# Patient Record
Sex: FEMALE | Race: WHITE | NOT HISPANIC OR LATINO | Employment: PART TIME | URBAN - METROPOLITAN AREA
[De-identification: names, ages, dates, MRNs, and addresses within clinical notes are randomized per-mention and may not be internally consistent; named-entity substitution may affect disease eponyms.]

---

## 2022-10-03 ENCOUNTER — OFFICE VISIT (OUTPATIENT)
Dept: FAMILY MEDICINE CLINIC | Facility: CLINIC | Age: 32
End: 2022-10-03

## 2022-10-03 VITALS
WEIGHT: 98.4 LBS | HEART RATE: 79 BPM | DIASTOLIC BLOOD PRESSURE: 84 MMHG | BODY MASS INDEX: 19.32 KG/M2 | TEMPERATURE: 98 F | OXYGEN SATURATION: 98 % | HEIGHT: 60 IN | SYSTOLIC BLOOD PRESSURE: 120 MMHG

## 2022-10-03 DIAGNOSIS — R79.89 ELEVATED TSH: ICD-10-CM

## 2022-10-03 DIAGNOSIS — N63.12 BREAST LUMP ON RIGHT SIDE AT 1 O'CLOCK POSITION: ICD-10-CM

## 2022-10-03 DIAGNOSIS — Z13.6 ENCOUNTER FOR LIPID SCREENING FOR CARDIOVASCULAR DISEASE: ICD-10-CM

## 2022-10-03 DIAGNOSIS — Z00.00 ENCOUNTER FOR ANNUAL PHYSICAL EXAM: Primary | ICD-10-CM

## 2022-10-03 DIAGNOSIS — Z11.59 NEED FOR HEPATITIS C SCREENING TEST: ICD-10-CM

## 2022-10-03 DIAGNOSIS — Z13.220 ENCOUNTER FOR LIPID SCREENING FOR CARDIOVASCULAR DISEASE: ICD-10-CM

## 2022-10-03 PROCEDURE — 99385 PREV VISIT NEW AGE 18-39: CPT | Performed by: NURSE PRACTITIONER

## 2022-10-03 NOTE — PROGRESS NOTES
Subjective   Josue Castanon is a 32 y.o. female.     Chief Complaint   Patient presents with   • Breast Mass     Lump in right breast X 1 week needs referral and under armpit pain      • Annual Exam       History of Present Illness   New patient, here to re-establish care; previous PCP with [facility]; has not seen since COVID-19 pandemic started 3/2020; patient presents for CPE with fasting labs; pretty healthy diet, tries to avoid sugar in diet; regular exercise about twice per week, lifts weights and does squats, lunges, etc and walks about 2 miles daily; regular dental visits; last eye exam last week, wears contacts; no problems hearing; immunizations: declines flu or COVID-19 vaccine, will come back for Tdap; will be seeing Dr. Osuna for female care 12/2022; last PAP about 5 years ago at [facility]; mammo never performed; no family history of breast cancer; colonoscopy never performed; no family history of colon cancer; LMP 9/12/22; has regular menses.    Also, c/o lump in right breast and discomfort under right arm x1 week; lump seems a little smaller than when first noted; area is a little tender; no redness or bruising; no skin dimpling; no noted lump under right arm; will have aching sensation when sleeps with arm above head as has always done; no family history of breast cancer.      The following portions of the patient's history were reviewed and updated as appropriate: allergies, current medications, past family history, past medical history, past social history, past surgical history and problem list.    No current outpatient medications on file prior to visit.     No current facility-administered medications on file prior to visit.        Past Medical History:   Diagnosis Date   • Allergic rhinitis    • Eczema        Past Surgical History:   Procedure Laterality Date   • WISDOM TOOTH EXTRACTION         Family History   Problem Relation Age of Onset   • No Known Problems Mother    • No Known Problems  "Father    • Diabetes Maternal Aunt    • Diabetes Paternal Uncle        Social History     Socioeconomic History   • Marital status: Single   Tobacco Use   • Smoking status: Never Smoker   • Smokeless tobacco: Never Used   Vaping Use   • Vaping Use: Never used   Substance and Sexual Activity   • Alcohol use: Never   • Drug use: Never   • Sexual activity: Yes     Partners: Male       Review of Systems   Constitutional: Negative for appetite change, chills, fatigue, fever and unexpected weight gain. Weight loss: weight fluctuates.   HENT: Negative for ear pain, sinus pressure, sore throat and trouble swallowing.    Eyes: Negative for blurred vision and discharge.   Respiratory: Negative for cough, chest tightness and shortness of breath.    Cardiovascular: Negative for chest pain, palpitations and leg swelling.   Gastrointestinal: Negative for abdominal pain, blood in stool, constipation, diarrhea, GERD and indigestion.   Endocrine: Negative for cold intolerance, heat intolerance and polydipsia.   Genitourinary: Negative for dysuria and frequency.   Musculoskeletal: Negative for arthralgias and back pain.   Skin: Negative for rash and skin lesions.   Neurological: Negative for dizziness, syncope, light-headedness and headache.   Hematological: Does not bruise/bleed easily.   Psychiatric/Behavioral: Negative for depressed mood. The patient is not nervous/anxious.        Objective   Vitals:    10/03/22 1507   BP: 120/84   BP Location: Left arm   Patient Position: Sitting   Cuff Size: Adult   Pulse: 79   Temp: 98 °F (36.7 °C)   SpO2: 98%   Weight: 44.6 kg (98 lb 6.4 oz)   Height: 152.4 cm (60\")     Body mass index is 19.22 kg/m².    Physical Exam  Vitals and nursing note reviewed.   Constitutional:       General: She is not in acute distress.     Appearance: She is well-developed and well-groomed. She is not diaphoretic.   HENT:      Head: Normocephalic and atraumatic.      Jaw: No tenderness or pain on movement.      " Right Ear: Tympanic membrane and external ear normal. No decreased hearing noted.      Left Ear: Tympanic membrane and external ear normal. No decreased hearing noted.      Nose: Nose normal.      Right Sinus: No maxillary sinus tenderness or frontal sinus tenderness.      Left Sinus: No maxillary sinus tenderness or frontal sinus tenderness.      Mouth/Throat:      Mouth: Mucous membranes are moist.      Pharynx: No oropharyngeal exudate or posterior oropharyngeal erythema.   Eyes:      Extraocular Movements: Extraocular movements intact.      Conjunctiva/sclera: Conjunctivae normal.      Pupils: Pupils are equal, round, and reactive to light.   Neck:      Thyroid: No thyromegaly.      Vascular: No carotid bruit.      Trachea: No tracheal deviation.   Cardiovascular:      Rate and Rhythm: Normal rate and regular rhythm.      Pulses: Normal pulses.      Heart sounds: Normal heart sounds. No murmur heard.  Pulmonary:      Effort: Pulmonary effort is normal. No respiratory distress.      Breath sounds: Normal breath sounds.   Chest:   Breasts:      Right: Mass present. No inverted nipple, nipple discharge, skin change or axillary adenopathy.      Left: No inverted nipple, mass, nipple discharge, skin change or axillary adenopathy.         Abdominal:      General: Bowel sounds are normal.      Palpations: Abdomen is soft. There is no hepatomegaly or splenomegaly.      Tenderness: There is no abdominal tenderness. There is no guarding.   Musculoskeletal:         General: Normal range of motion.      Cervical back: Normal range of motion and neck supple. No bony tenderness.      Thoracic back: No bony tenderness.      Lumbar back: No bony tenderness.      Right lower leg: No edema.      Left lower leg: No edema.   Lymphadenopathy:      Cervical: No cervical adenopathy.      Upper Body:      Right upper body: No axillary adenopathy.      Left upper body: No axillary adenopathy.   Skin:     General: Skin is warm and dry.       Findings: No rash.   Neurological:      Mental Status: She is alert and oriented to person, place, and time.      Cranial Nerves: No cranial nerve deficit.      Motor: Motor function is intact.      Coordination: Coordination normal.      Gait: Gait normal.      Deep Tendon Reflexes: Reflexes are normal and symmetric.   Psychiatric:         Mood and Affect: Mood normal.         Behavior: Behavior normal.         Thought Content: Thought content normal.         Cognition and Memory: Cognition normal.         Judgment: Judgment normal.         Lab Results   Component Value Date    WBC 7.15 10/30/2014    RBC 4.71 10/30/2014    HGB 13.3 10/30/2014    HCT 39.6 10/30/2014    MCV 84.1 10/30/2014    MCH 28.2 10/30/2014    MCHC 33.6 10/30/2014    RDW 11.8 10/30/2014     10/30/2014    NEUTRORELPCT 53.0 10/30/2014    LYMPHORELPCT 37.9 10/30/2014    MONORELPCT 8.0 10/30/2014    EOSRELPCT 0.7 10/30/2014    BASORELPCT 0.1 10/30/2014    NEUTROABS 3.8 10/30/2014    LYMPHSABS 2.7 10/30/2014    MONOSABS 0.6 10/30/2014    EOSABS 0.1 10/30/2014    BASOSABS 0.0 10/30/2014     Lab Results   Component Value Date    GLUCOSE 89 10/30/2014    BUN 9 10/30/2014    CREATININE 0.69 10/30/2014    K 3.3 (L) 10/30/2014    CO2 26 10/30/2014    CALCIUM 9.1 10/30/2014    ALBUMIN 4.5 10/30/2014    AST 19 10/30/2014    ALT 9 10/30/2014      Lab Results   Component Value Date    TSH 5.38 (H) 10/30/2014         Assessment    Problem List Items Addressed This Visit     Body mass index (BMI) of 19.0-19.9 in adult    Relevant Orders    TSH Rfx On Abnormal To Free T4    Breast lump on right side at 1 o'clock position    Relevant Orders    CBC & Differential    Mammo Diagnostic Digital Tomosynthesis Bilateral With CAD    US Breast Bilateral Limited    Elevated TSH    Relevant Orders    TSH Rfx On Abnormal To Free T4      Other Visit Diagnoses     Encounter for annual physical exam    -  Primary    Relevant Orders    Comprehensive Metabolic Panel     CBC & Differential    Lipid Panel With LDL / HDL Ratio    TSH Rfx On Abnormal To Free T4    Encounter for lipid screening for cardiovascular disease        Relevant Orders    Lipid Panel With LDL / HDL Ratio    Need for hepatitis C screening test        Relevant Orders    Hepatitis C Antibody          Return if symptoms worsen or fail to improve.  Impression: Health maintenance visit.  Currently, eats a pretty healthy diet and has a regular exercise routine.  Cervical cancer screening: upcoming PAP with GYN.  Breast cancer screening: not indicated.  Colorectal cancer screening: not indicated.  Screening lab work includes: CMP, lipid.  Immunizations: declines flu or COVID-19 vaccine, will come back for Tdap; risks and benefits of immunizations were discussed with patient.  Advice and education were given regarding nutrition and aerobic exercise.         COVID-19 Precautions - Patient was compliant in wearing a mask. When I saw the patient, I used appropriate personal protective equipment (PPE) including mask, gloves, and eye shield (standard procedure).  Hand hygiene was completed before and after seeing the patient.

## 2022-10-04 PROBLEM — R79.89 ELEVATED TSH: Status: ACTIVE | Noted: 2022-10-04

## 2022-10-04 PROBLEM — N63.12 BREAST LUMP ON RIGHT SIDE AT 1 O'CLOCK POSITION: Status: ACTIVE | Noted: 2022-10-04

## 2022-10-04 LAB
ALBUMIN SERPL-MCNC: 5.1 G/DL (ref 3.8–4.8)
ALBUMIN/GLOB SERPL: 2.1 {RATIO} (ref 1.2–2.2)
ALP SERPL-CCNC: 68 IU/L (ref 44–121)
ALT SERPL-CCNC: 14 IU/L (ref 0–32)
AST SERPL-CCNC: 17 IU/L (ref 0–40)
BASOPHILS # BLD AUTO: 0 X10E3/UL (ref 0–0.2)
BASOPHILS NFR BLD AUTO: 0 %
BILIRUB SERPL-MCNC: 0.2 MG/DL (ref 0–1.2)
BUN SERPL-MCNC: 10 MG/DL (ref 6–20)
BUN/CREAT SERPL: 11 (ref 9–23)
CALCIUM SERPL-MCNC: 9.6 MG/DL (ref 8.7–10.2)
CHLORIDE SERPL-SCNC: 102 MMOL/L (ref 96–106)
CHOLEST SERPL-MCNC: 199 MG/DL (ref 100–199)
CO2 SERPL-SCNC: 23 MMOL/L (ref 20–29)
CREAT SERPL-MCNC: 0.89 MG/DL (ref 0.57–1)
EGFRCR SERPLBLD CKD-EPI 2021: 87 ML/MIN/1.73
EOSINOPHIL # BLD AUTO: 0.1 X10E3/UL (ref 0–0.4)
EOSINOPHIL NFR BLD AUTO: 1 %
ERYTHROCYTE [DISTWIDTH] IN BLOOD BY AUTOMATED COUNT: 11.6 % (ref 11.7–15.4)
GLOBULIN SER CALC-MCNC: 2.4 G/DL (ref 1.5–4.5)
GLUCOSE SERPL-MCNC: 93 MG/DL (ref 70–99)
HCT VFR BLD AUTO: 42.8 % (ref 34–46.6)
HCV AB S/CO SERPL IA: <0.1 S/CO RATIO (ref 0–0.9)
HDLC SERPL-MCNC: 67 MG/DL
HGB BLD-MCNC: 14.1 G/DL (ref 11.1–15.9)
IMM GRANULOCYTES # BLD AUTO: 0 X10E3/UL (ref 0–0.1)
IMM GRANULOCYTES NFR BLD AUTO: 0 %
LDLC SERPL CALC-MCNC: 116 MG/DL (ref 0–99)
LDLC/HDLC SERPL: 1.7 RATIO (ref 0–3.2)
LYMPHOCYTES # BLD AUTO: 2.4 X10E3/UL (ref 0.7–3.1)
LYMPHOCYTES NFR BLD AUTO: 26 %
MCH RBC QN AUTO: 28.5 PG (ref 26.6–33)
MCHC RBC AUTO-ENTMCNC: 32.9 G/DL (ref 31.5–35.7)
MCV RBC AUTO: 87 FL (ref 79–97)
MONOCYTES # BLD AUTO: 0.6 X10E3/UL (ref 0.1–0.9)
MONOCYTES NFR BLD AUTO: 6 %
NEUTROPHILS # BLD AUTO: 6.2 X10E3/UL (ref 1.4–7)
NEUTROPHILS NFR BLD AUTO: 67 %
PLATELET # BLD AUTO: 248 X10E3/UL (ref 150–450)
POTASSIUM SERPL-SCNC: 4.7 MMOL/L (ref 3.5–5.2)
PROT SERPL-MCNC: 7.5 G/DL (ref 6–8.5)
RBC # BLD AUTO: 4.94 X10E6/UL (ref 3.77–5.28)
SODIUM SERPL-SCNC: 142 MMOL/L (ref 134–144)
TRIGL SERPL-MCNC: 91 MG/DL (ref 0–149)
TSH SERPL DL<=0.005 MIU/L-ACNC: 2.03 UIU/ML (ref 0.45–4.5)
VLDLC SERPL CALC-MCNC: 16 MG/DL (ref 5–40)
WBC # BLD AUTO: 9.3 X10E3/UL (ref 3.4–10.8)

## 2022-10-04 NOTE — PATIENT INSTRUCTIONS
Continue healthy diet and exercise.  Follow up pending lab/test results.  Follow up if symptoms persist or worsen.

## 2022-10-11 ENCOUNTER — TELEPHONE (OUTPATIENT)
Dept: FAMILY MEDICINE CLINIC | Facility: CLINIC | Age: 32
End: 2022-10-11

## 2022-10-11 NOTE — TELEPHONE ENCOUNTER
Patient would like her Mammo & US orders redone so that she may go to [facility].

## 2022-10-12 ENCOUNTER — APPOINTMENT (OUTPATIENT)
Dept: ULTRASOUND IMAGING | Facility: HOSPITAL | Age: 32
End: 2022-10-12

## 2022-10-12 ENCOUNTER — HOSPITAL ENCOUNTER (OUTPATIENT)
Dept: MAMMOGRAPHY | Facility: HOSPITAL | Age: 32
End: 2022-10-12

## 2022-10-20 ENCOUNTER — TELEPHONE (OUTPATIENT)
Dept: FAMILY MEDICINE CLINIC | Facility: CLINIC | Age: 32
End: 2022-10-20

## 2022-10-20 NOTE — TELEPHONE ENCOUNTER
Called, no answer and no voice mail, will call again later.      **HUB/** MAY RELAY MESSAGE WHEN PATIENT CALL BACK.        ----- Message from TRINIDAD Deleon sent at 10/20/2022 12:42 PM EDT -----  Please inform patient that diagnostic mammogram and ultrasound is normal.

## 2022-12-30 ENCOUNTER — OFFICE VISIT (OUTPATIENT)
Dept: OBSTETRICS AND GYNECOLOGY | Facility: CLINIC | Age: 32
End: 2022-12-30

## 2022-12-30 VITALS
DIASTOLIC BLOOD PRESSURE: 81 MMHG | BODY MASS INDEX: 18.53 KG/M2 | SYSTOLIC BLOOD PRESSURE: 122 MMHG | WEIGHT: 94.4 LBS | HEART RATE: 75 BPM | HEIGHT: 60 IN

## 2022-12-30 DIAGNOSIS — Z01.419 ENCOUNTER FOR GYNECOLOGICAL EXAMINATION: Primary | ICD-10-CM

## 2022-12-30 DIAGNOSIS — N64.4 MASTALGIA: ICD-10-CM

## 2022-12-30 PROCEDURE — 99385 PREV VISIT NEW AGE 18-39: CPT | Performed by: OBSTETRICS & GYNECOLOGY

## 2022-12-30 NOTE — PROGRESS NOTES
"Chief Complaint  Annual Exam- Pap- Pt states more than three years    Subjective        [facility]  History of Present Illness  Patient is a 32-year-old that presents for gynecological exam.  She does report breast tenderness that has seemed worse over the last year.  She did have breast imaging in October that was normal.  She does report that she sleeps in a bra and wears a bra at all times.  She reports regular menstrual cycles.  She is currently sexually active, but declines contraception.  She denies any family history of cancer.  Objective   Vital Signs:  /81   Pulse 75   Ht 152.4 cm (60\")   Wt 42.8 kg (94 lb 6.4 oz)   BMI 18.44 kg/m²   Estimated body mass index is 18.44 kg/m² as calculated from the following:    Height as of this encounter: 152.4 cm (60\").    Weight as of this encounter: 42.8 kg (94 lb 6.4 oz).          Physical Exam  Vitals reviewed. Exam conducted with a chaperone present.   Constitutional:       Appearance: She is well-developed.   Cardiovascular:      Rate and Rhythm: Normal rate and regular rhythm.   Pulmonary:      Effort: Pulmonary effort is normal.      Breath sounds: Normal breath sounds.   Chest:   Breasts:     Right: No inverted nipple, mass, nipple discharge, skin change or tenderness.      Left: No inverted nipple, mass, nipple discharge, skin change or tenderness.   Abdominal:      General: There is no distension.      Palpations: Abdomen is soft.      Tenderness: There is no abdominal tenderness.   Genitourinary:     Labia:         Right: No rash, tenderness, lesion or injury.         Left: No rash, tenderness, lesion or injury.       Vagina: Normal.      Cervix: Normal.      Uterus: Normal.       Adnexa:         Right: No mass, tenderness or fullness.          Left: No mass, tenderness or fullness.     Neurological:      Mental Status: She is alert.        Result Review :                Assessment " and Plan   Diagnoses and all orders for this visit:    1. Encounter for gynecological examination (Primary)    2. Mastalgia    She was counseled on monthly self breast exams for breast health.  She was counseled on common causes for breast tenderness and discussed conservative management options at home including limiting caffeine, supportive bra, and vitamin D supplementation.         Follow Up   Return in about 1 year (around 12/30/2023) for gynecological exam.  Patient was given instructions and counseling regarding her condition or for health maintenance advice. Please see specific information pulled into the AVS if appropriate.

## 2023-01-09 LAB
CYTOLOGIST CVX/VAG CYTO: ABNORMAL
CYTOLOGY CVX/VAG DOC CYTO: ABNORMAL
CYTOLOGY CVX/VAG DOC THIN PREP: ABNORMAL
DX ICD CODE: ABNORMAL
DX ICD CODE: ABNORMAL
HIV 1 & 2 AB SER-IMP: ABNORMAL
HPV I/H RISK 4 DNA CVX QL PROBE+SIG AMP: POSITIVE
HPV16 DNA CVX QL PROBE+SIG AMP: POSITIVE
HPV18+45 E6+E7 MRNA CVX QL NAA+PROBE: NEGATIVE
OTHER STN SPEC: ABNORMAL
PATH REPORT.COMMENTS IMP SPEC: ABNORMAL
PATHOLOGIST CVX/VAG CYTO: ABNORMAL
RECOM F/U CVX/VAG CYTO: ABNORMAL
STAT OF ADQ CVX/VAG CYTO-IMP: ABNORMAL

## 2023-01-11 ENCOUNTER — TELEPHONE (OUTPATIENT)
Dept: OBSTETRICS AND GYNECOLOGY | Facility: CLINIC | Age: 33
End: 2023-01-11
Payer: COMMERCIAL

## 2023-01-11 NOTE — TELEPHONE ENCOUNTER
I attempted to call the patient regarding her abnormal Pap smear.  There was no answer at the number listed in her chart and voice mailbox was not set up.

## 2023-01-13 ENCOUNTER — TELEPHONE (OUTPATIENT)
Dept: OBSTETRICS AND GYNECOLOGY | Facility: CLINIC | Age: 33
End: 2023-01-13
Payer: COMMERCIAL

## 2023-01-13 NOTE — TELEPHONE ENCOUNTER
Caller: Josue Castanon    Relationship: Self    Best call back number: [phone]    What was the call regarding: PT RETURNING MISSED CALL FROM     Do you require a callback: YES

## 2023-01-25 ENCOUNTER — PROCEDURE VISIT (OUTPATIENT)
Dept: OBSTETRICS AND GYNECOLOGY | Facility: CLINIC | Age: 33
End: 2023-01-25
Payer: COMMERCIAL

## 2023-01-25 VITALS
HEART RATE: 76 BPM | SYSTOLIC BLOOD PRESSURE: 124 MMHG | BODY MASS INDEX: 18.44 KG/M2 | DIASTOLIC BLOOD PRESSURE: 84 MMHG | WEIGHT: 94.4 LBS

## 2023-01-25 DIAGNOSIS — R87.611 PAP SMEAR OF CERVIX WITH ASCUS, CANNOT EXCLUDE HGSIL: Primary | ICD-10-CM

## 2023-01-25 LAB
B-HCG UR QL: NEGATIVE
EXPIRATION DATE: NORMAL
INTERNAL NEGATIVE CONTROL: NEGATIVE
INTERNAL POSITIVE CONTROL: POSITIVE
Lab: NORMAL

## 2023-01-25 PROCEDURE — 57454 BX/CURETT OF CERVIX W/SCOPE: CPT | Performed by: OBSTETRICS & GYNECOLOGY

## 2023-01-25 PROCEDURE — 81025 URINE PREGNANCY TEST: CPT | Performed by: OBSTETRICS & GYNECOLOGY

## 2023-01-25 NOTE — PROGRESS NOTES
Procedure   Procedures       Physical Exam    Colposcopy Procedure Note    Indications: LGSIL; ASCUS-H; positive HPV 16    Procedure Details   The risks and benefits of the procedure and Verbal informed consent obtained.    Speculum placed in vagina and excellent visualization of cervix achieved, cervix swabbed x 3 with acetic acid solution.    Findings:  Cervix: HPV changes noted at 9 o'clock; cervix swabbed with Lugol's solution, SCJ visualized - lesion at 9 o'clock, endocervical curettage performed, cervical biopsies taken at 3, 6, and 9 o'clock, specimen labelled and sent to pathology and hemostasis achieved with Monsel's solution.  Vaginal inspection: normal without visible lesions.  Vulvar colposcopy: vulvar colposcopy not performed.    Specimens: Cervical biopsies at 3, 6, and 9:00; ECC    Complications: none.    Patient tolerated the procedure well without complications.    Plan:  Specimens labelled and sent to Pathology.  Will base further treatment on Pathology findings.  Treatment options discussed with patient.  Post biopsy instructions given to patient.        1/25/2023  Dhara Osuna MD

## 2023-01-27 LAB
DX ICD CODE: NORMAL
DX ICD CODE: NORMAL
PATH REPORT.FINAL DX SPEC: NORMAL
PATH REPORT.GROSS SPEC: NORMAL
PATH REPORT.SITE OF ORIGIN SPEC: NORMAL
PATHOLOGIST NAME: NORMAL
PAYMENT PROCEDURE: NORMAL

## 2023-01-31 ENCOUNTER — TELEPHONE (OUTPATIENT)
Dept: OBSTETRICS AND GYNECOLOGY | Facility: CLINIC | Age: 33
End: 2023-01-31
Payer: COMMERCIAL

## 2023-01-31 NOTE — TELEPHONE ENCOUNTER
Attempted to call patient to discuss colposcopy results and no answer.  Voicemail was not set up.  If patient calls back, please find a time that is convenient for her for a call back.

## 2023-01-31 NOTE — TELEPHONE ENCOUNTER
Returned phone call.  Discussed recommendations for a LEEP procedure and all questions answered.  She will schedule.

## 2023-01-31 NOTE — TELEPHONE ENCOUNTER
Pt returning missed call to discuss colpo results. Says she is available now to discuss or this afternoon (4/5pm) when provider has time.     Laureen

## 2023-03-01 ENCOUNTER — PROCEDURE VISIT (OUTPATIENT)
Dept: OBSTETRICS AND GYNECOLOGY | Facility: CLINIC | Age: 33
End: 2023-03-01
Payer: COMMERCIAL

## 2023-03-01 VITALS
DIASTOLIC BLOOD PRESSURE: 85 MMHG | SYSTOLIC BLOOD PRESSURE: 123 MMHG | BODY MASS INDEX: 17.67 KG/M2 | HEIGHT: 60 IN | HEART RATE: 65 BPM | WEIGHT: 90 LBS

## 2023-03-01 DIAGNOSIS — D06.9 SEVERE DYSPLASIA OF CERVIX (CIN III): Primary | ICD-10-CM

## 2023-03-01 PROCEDURE — 81025 URINE PREGNANCY TEST: CPT | Performed by: OBSTETRICS & GYNECOLOGY

## 2023-03-01 PROCEDURE — 57461 CONZ OF CERVIX W/SCOPE LEEP: CPT | Performed by: OBSTETRICS & GYNECOLOGY

## 2023-03-01 NOTE — PROGRESS NOTES
CC: LEEP    Procedure: Loop electrocautery excision procedure  Preoperative diagnosis: High-grade dysplasia  Postoperative diagnosis: Same  Indications: Patient recently had a colposcopy and biopsies returned as high-grade.  Recommendations were to proceed with an excisional procedure and she agreed to proceed with LEEP procedure.  Anesthesia: 1% lidocaine with epinephrine for paracervical block  Pathology:   1.  Loop cervix  2.  Endocervical curettage  Estimated blood loss: Less than 5 mL    Procedure in detail:  The risks, benefits, alternatives were discussed with the patient.  We also discussed the risks of infection, bleeding, persistent disease following treatment, and difficulties with pregnancies in the future either being subfertility, infertility, or  birth.  Overall feel the benefits of treatment outweigh the risks.  Recommendations are given to the patient, and she wishes to proceed.  Her office pregnancy test today is negative.  Patient provides verbal consent and wishes to proceed.  The patient was placed in the examining table in the dorsal lithotomy position.  A coated bivalve speculum was placed in the vagina and the cervix was easily visualized.  Lugol's solution was applied liberally over the cervix.  10 cc of 1% lidocaine with epinephrine was injected circumferentially around the cervix for a paracervical block.  The patient tolerated this well with no side effects.    1 pass was made with a loop electrode to excise the cervical cells with decreased Lugol's uptake.  An endocervical curettage was then performed.  The cervical bed was then thoroughly coagulated using the rollerball electrode.  Excellent hemostasis was noted.  Monsel solution was applied to the bed of the cervix.  Again excellent hemostasis was noted.  All the instruments were removed from the vagina.  The patient tolerated the procedure well with no significant complications.  There was no significant bleeding.  The patient  was given instructions to maintain pelvic rest for at least the next 4 weeks.  She is counseled to call the office or return to the hospital for fevers, significant pain, or vaginal bleeding greater than 1 pad per hour.  She verbalized understanding.

## 2023-03-03 LAB
B-HCG UR QL: NEGATIVE
DX ICD CODE: NORMAL
DX ICD CODE: NORMAL
EXPIRATION DATE: NORMAL
INTERNAL NEGATIVE CONTROL: NEGATIVE
INTERNAL POSITIVE CONTROL: POSITIVE
Lab: NORMAL
PATH REPORT.FINAL DX SPEC: NORMAL
PATH REPORT.GROSS SPEC: NORMAL
PATH REPORT.SITE OF ORIGIN SPEC: NORMAL
PATHOLOGIST NAME: NORMAL
PAYMENT PROCEDURE: NORMAL

## 2023-03-06 ENCOUNTER — TELEPHONE (OUTPATIENT)
Dept: OBSTETRICS AND GYNECOLOGY | Facility: CLINIC | Age: 33
End: 2023-03-06
Payer: COMMERCIAL

## 2023-03-06 NOTE — TELEPHONE ENCOUNTER
Caller: Josue Castanon    Relationship: Self    Best call back number:[phone]  What is the best time to reach you: ANYTIME    Who are you requesting to speak with (clinical staff, provider,  specific staff member): DR. VARMA    Do you know the name of the person who called: DR. VARMA    What was the call regarding: NA    Do you require a callback: YES ANYTIME

## 2023-08-01 ENCOUNTER — TELEPHONE (OUTPATIENT)
Dept: FAMILY MEDICINE CLINIC | Facility: CLINIC | Age: 33
End: 2023-08-01

## 2023-08-01 NOTE — TELEPHONE ENCOUNTER
Caller: Josue Castanon    Relationship to patient: Self    Best call back number: [phone]     Chief complaint: REMOVE INFECTED PIERCING ON RIGHT SIDE OF RIB CAGE/IT IS RED WITH SOME PUS.     Type of visit: IN OFFICE PROCEDURE    Requested date: ASAP/NOT AVAILABLE IN THE MORNINGS     If rescheduling, when is the original appointment: NA    Additional notes:PLEASE CALL

## 2023-08-09 ENCOUNTER — OFFICE VISIT (OUTPATIENT)
Dept: FAMILY MEDICINE CLINIC | Facility: CLINIC | Age: 33
End: 2023-08-09
Payer: COMMERCIAL

## 2023-08-09 VITALS
OXYGEN SATURATION: 98 % | HEART RATE: 78 BPM | WEIGHT: 95 LBS | SYSTOLIC BLOOD PRESSURE: 102 MMHG | BODY MASS INDEX: 18.55 KG/M2 | DIASTOLIC BLOOD PRESSURE: 60 MMHG

## 2023-08-09 DIAGNOSIS — S30.851A FOREIGN BODY IN SKIN OF ABDOMEN: Primary | ICD-10-CM

## 2023-08-09 NOTE — PROGRESS NOTES
Rupa Castanon is a 33 y.o. female.     Chief Complaint   Patient presents with    piercing infected     On right side        History of Present Illness   Patient presents with c/o infected piercing on right side of abdomen; has tenderness in area; symptoms started a few weeks ago; had some pus from area and has started to scab; no warmth; drainage has improved; had tried salt spray and was not healing; then noted piercing was starting to push through skin; has tried Betadine to keep clean; no fever; no malaise; has had piercing for over 10 years and then started pushing out through skin.    F/U breast lump: benign cysts noted mammo/US in 10/2022.    The following portions of the patient's history were reviewed and updated as appropriate: allergies, current medications, past family history, past medical history, past social history, past surgical history and problem list.    No current outpatient medications on file prior to visit.     No current facility-administered medications on file prior to visit.       Past Medical History:   Diagnosis Date    Allergic rhinitis     Eczema        Past Surgical History:   Procedure Laterality Date    WISDOM TOOTH EXTRACTION         Family History   Problem Relation Age of Onset    No Known Problems Mother     No Known Problems Father     Diabetes Maternal Aunt     Diabetes Paternal Uncle        Social History     Socioeconomic History    Marital status: Single   Tobacco Use    Smoking status: Never    Smokeless tobacco: Never   Vaping Use    Vaping Use: Never used   Substance and Sexual Activity    Alcohol use: Never    Drug use: Never    Sexual activity: Yes     Partners: Male       Review of Systems   Constitutional:  Positive for fatigue (attributes to current menses; no heavy menses). Negative for appetite change, chills, unexpected weight gain and unexpected weight loss.   Respiratory:  Negative for cough, chest tightness and shortness of breath.     Cardiovascular:  Negative for chest pain, palpitations and leg swelling.   Gastrointestinal:  Negative for abdominal pain, constipation and diarrhea.   Genitourinary:  Negative for dysuria and frequency.   Skin:  Negative for rash.   Neurological:  Negative for dizziness, light-headedness and headache.     Objective   Vitals:    08/09/23 1309   BP: 102/60   BP Location: Right arm   Patient Position: Sitting   Cuff Size: Adult   Pulse: 78   SpO2: 98%   Weight: 43.1 kg (95 lb)     Body mass index is 18.55 kg/mý.    Physical Exam  Vitals and nursing note reviewed.   Constitutional:       General: She is not in acute distress.     Appearance: She is well-developed and well-groomed. She is not diaphoretic.   HENT:      Head: Normocephalic.      Right Ear: External ear normal.      Left Ear: External ear normal.   Eyes:      Conjunctiva/sclera: Conjunctivae normal.   Neck:      Vascular: No carotid bruit.   Cardiovascular:      Rate and Rhythm: Normal rate and regular rhythm.      Pulses: Normal pulses.      Heart sounds: Normal heart sounds. No murmur heard.  Pulmonary:      Effort: Pulmonary effort is normal. No respiratory distress.      Breath sounds: Normal breath sounds.   Abdominal:      General: Bowel sounds are normal.      Palpations: Abdomen is soft. There is no hepatomegaly or splenomegaly.      Tenderness: There is no abdominal tenderness. There is no guarding.   Musculoskeletal:      Cervical back: Normal range of motion and neck supple.      Right lower leg: No edema.      Left lower leg: No edema.   Skin:     General: Skin is warm and dry.          Neurological:      Mental Status: She is alert and oriented to person, place, and time.      Gait: Gait normal.   Psychiatric:         Mood and Affect: Mood normal.         Behavior: Behavior normal.         Thought Content: Thought content normal.         Cognition and Memory: Cognition normal.         Judgment: Judgment normal.       Lab Results   Component  Value Date    WBC 9.3 10/03/2022    RBC 4.94 10/03/2022    HGB 14.1 10/03/2022    HCT 42.8 10/03/2022    MCV 87 10/03/2022    MCH 28.5 10/03/2022    MCHC 32.9 10/03/2022    RDW 11.6 (L) 10/03/2022     10/03/2022    NEUTRORELPCT 67 10/03/2022    LYMPHORELPCT 26 10/03/2022    MONORELPCT 6 10/03/2022    EOSRELPCT 1 10/03/2022    BASORELPCT 0 10/03/2022    NEUTROABS 6.2 10/03/2022    LYMPHSABS 2.4 10/03/2022    MONOSABS 0.6 10/03/2022    EOSABS 0.1 10/03/2022    BASOSABS 0.0 10/03/2022     Lab Results   Component Value Date    GLUCOSE 93 10/03/2022    BUN 10 10/03/2022    CREATININE 0.89 10/03/2022    BCR 11 10/03/2022    K 4.7 10/03/2022    CO2 23 10/03/2022    CALCIUM 9.6 10/03/2022    PROTENTOTREF 7.5 10/03/2022    ALBUMIN 5.1 (H) 10/03/2022    LABIL2 2.1 10/03/2022    AST 17 10/03/2022    ALT 14 10/03/2022      Lab Results   Component Value Date    CHLPL 199 10/03/2022    TRIG 91 10/03/2022    HDL 67 10/03/2022    VLDL 16 10/03/2022     (H) 10/03/2022     Lab Results   Component Value Date    TSH 2.030 10/03/2022         Assessment    Problem List Items Addressed This Visit       Foreign body in skin of abdomen - Primary    Current Assessment & Plan     Continue to keep piercing site clean and dry.  Continue to monitor site of piercing for any signs of infection.         Relevant Orders    Ambulatory Referral to General Surgery        Return if symptoms worsen or fail to improve.

## 2023-08-09 NOTE — PATIENT INSTRUCTIONS
Continue to keep piercing site clean and dry.  Continue to monitor site of piercing for any signs of infection.  Follow up if symptoms persist or worsen.

## 2023-08-10 PROBLEM — S30.851A: Status: ACTIVE | Noted: 2023-08-10

## 2023-08-10 NOTE — ASSESSMENT & PLAN NOTE
Continue to keep piercing site clean and dry.  Continue to monitor site of piercing for any signs of infection.

## 2023-09-01 ENCOUNTER — OFFICE VISIT (OUTPATIENT)
Dept: OBSTETRICS AND GYNECOLOGY | Facility: CLINIC | Age: 33
End: 2023-09-01
Payer: COMMERCIAL

## 2023-09-01 VITALS
WEIGHT: 95 LBS | HEART RATE: 69 BPM | DIASTOLIC BLOOD PRESSURE: 78 MMHG | SYSTOLIC BLOOD PRESSURE: 117 MMHG | BODY MASS INDEX: 18.65 KG/M2 | HEIGHT: 60 IN

## 2023-09-01 DIAGNOSIS — D06.9 SEVERE DYSPLASIA OF CERVIX (CIN III): Primary | ICD-10-CM

## 2023-09-01 NOTE — PROGRESS NOTES
"Chief Complaint  Abnormal Pap Smear- pt here for 6 month repeat pap smear.     Subjective        Josue Castanon presents to [facility]  History of Present Illness  Patient presents for 6-month repeat Pap smear.  She underwent a LEEP biopsy 6 months ago and pathology returned as high-grade with involvement of the ectocervical glands.  She has no complaints today.  Objective   Vital Signs:  /78   Pulse 69   Ht 152.4 cm (60\")   Wt 43.1 kg (95 lb)   BMI 18.55 kg/mý   Estimated body mass index is 18.55 kg/mý as calculated from the following:    Height as of this encounter: 152.4 cm (60\").    Weight as of this encounter: 43.1 kg (95 lb).       BMI is within normal parameters. No other follow-up for BMI required.      Physical Exam  Vitals reviewed. Exam conducted with a chaperone present.   Constitutional:       Appearance: She is well-developed.   Genitourinary:     Labia:         Right: No rash, tenderness, lesion or injury.         Left: No rash, tenderness, lesion or injury.       Vagina: Normal.   Neurological:      Mental Status: She is alert.   Psychiatric:         Behavior: Behavior normal.      Result Review :                   Assessment and Plan   Diagnoses and all orders for this visit:    1. Severe dysplasia of cervix (EILEEN III) (Primary)  -     IGP, Apt HPV,rfx 16 / 18,45             Follow Up   No follow-ups on file.  Patient was given instructions and counseling regarding her condition or for health maintenance advice. Please see specific information pulled into the AVS if appropriate.         "

## 2023-09-08 LAB
CYTOLOGIST CVX/VAG CYTO: NORMAL
CYTOLOGY CVX/VAG DOC CYTO: NORMAL
CYTOLOGY CVX/VAG DOC THIN PREP: NORMAL
DX ICD CODE: NORMAL
HIV 1 & 2 AB SER-IMP: NORMAL
HPV I/H RISK 4 DNA CVX QL PROBE+SIG AMP: NEGATIVE
OTHER STN SPEC: NORMAL
PATHOLOGIST CVX/VAG CYTO: NORMAL
STAT OF ADQ CVX/VAG CYTO-IMP: NORMAL

## 2023-09-13 ENCOUNTER — TELEPHONE (OUTPATIENT)
Dept: OBSTETRICS AND GYNECOLOGY | Facility: CLINIC | Age: 33
End: 2023-09-13
Payer: COMMERCIAL

## 2023-09-13 NOTE — TELEPHONE ENCOUNTER
----- Message from Dhara Osuna MD sent at 9/8/2023  3:15 PM EDT -----  Please let patient know that her Pap smear returned normal, and we will repeat this Pap smear again when I see her in March for her annual.

## 2023-09-19 ENCOUNTER — TELEPHONE (OUTPATIENT)
Dept: OBSTETRICS AND GYNECOLOGY | Facility: CLINIC | Age: 33
End: 2023-09-19
Payer: COMMERCIAL

## 2023-09-19 NOTE — TELEPHONE ENCOUNTER
Her LEEP was done in March, so we would not expect her to be having pain in that area this far out from the procedure.  That pain could be related to numerous other things such as musculoskeletal pain or kidney stones and would recommend that she be evaluated by her primary care provider.  If she is needing to see me for gynecological issue, we would need to know the reason to help with scheduling.

## 2023-09-22 ENCOUNTER — HOSPITAL ENCOUNTER (EMERGENCY)
Facility: HOSPITAL | Age: 33
Discharge: HOME | End: 2023-09-22
Attending: EMERGENCY MEDICINE
Payer: COMMERCIAL

## 2023-09-22 ENCOUNTER — APPOINTMENT (OUTPATIENT)
Dept: CT IMAGING | Facility: HOSPITAL | Age: 33
End: 2023-09-22
Payer: COMMERCIAL

## 2023-09-22 VITALS
OXYGEN SATURATION: 98 % | TEMPERATURE: 97.6 F | DIASTOLIC BLOOD PRESSURE: 71 MMHG | HEART RATE: 75 BPM | BODY MASS INDEX: 17.67 KG/M2 | HEIGHT: 60 IN | RESPIRATION RATE: 16 BRPM | WEIGHT: 90 LBS | SYSTOLIC BLOOD PRESSURE: 105 MMHG

## 2023-09-22 DIAGNOSIS — N20.0 NEPHROLITHIASIS: ICD-10-CM

## 2023-09-22 DIAGNOSIS — R19.7 DIARRHEA, UNSPECIFIED TYPE: ICD-10-CM

## 2023-09-22 DIAGNOSIS — R10.9 ACUTE LEFT FLANK PAIN: Primary | ICD-10-CM

## 2023-09-22 LAB
ALBUMIN SERPL-MCNC: 5 G/DL (ref 3.5–5.2)
ALBUMIN/GLOB SERPL: 2.3 G/DL
ALP SERPL-CCNC: 65 U/L (ref 39–117)
ALT SERPL W P-5'-P-CCNC: 13 U/L (ref 1–33)
ANION GAP SERPL CALCULATED.3IONS-SCNC: 13 MMOL/L (ref 5–15)
AST SERPL-CCNC: 20 U/L (ref 1–32)
B-HCG UR QL: NEGATIVE
BASOPHILS # BLD AUTO: 0.04 10*3/MM3 (ref 0–0.2)
BASOPHILS NFR BLD AUTO: 0.6 % (ref 0–1.5)
BILIRUB SERPL-MCNC: 0.4 MG/DL (ref 0–1.2)
BILIRUB UR QL STRIP: NEGATIVE
BUN SERPL-MCNC: 8 MG/DL (ref 6–20)
BUN/CREAT SERPL: 12.3 (ref 7–25)
CALCIUM SPEC-SCNC: 9.5 MG/DL (ref 8.6–10.5)
CHLORIDE SERPL-SCNC: 103 MMOL/L (ref 98–107)
CLARITY UR: CLEAR
CO2 SERPL-SCNC: 24 MMOL/L (ref 22–29)
COLOR UR: YELLOW
CREAT SERPL-MCNC: 0.65 MG/DL (ref 0.57–1)
DEPRECATED RDW RBC AUTO: 37.6 FL (ref 37–54)
EGFRCR SERPLBLD CKD-EPI 2021: 117.9 ML/MIN/1.73
EOSINOPHIL # BLD AUTO: 0.04 10*3/MM3 (ref 0–0.4)
EOSINOPHIL NFR BLD AUTO: 0.6 % (ref 0.3–6.2)
ERYTHROCYTE [DISTWIDTH] IN BLOOD BY AUTOMATED COUNT: 11.7 % (ref 12.3–15.4)
GLOBULIN UR ELPH-MCNC: 2.2 GM/DL
GLUCOSE SERPL-MCNC: 104 MG/DL (ref 65–99)
GLUCOSE UR STRIP-MCNC: NEGATIVE MG/DL
HCT VFR BLD AUTO: 42.2 % (ref 34–46.6)
HGB BLD-MCNC: 14 G/DL (ref 12–15.9)
HGB UR QL STRIP.AUTO: NEGATIVE
IMM GRANULOCYTES # BLD AUTO: 0.02 10*3/MM3 (ref 0–0.05)
IMM GRANULOCYTES NFR BLD AUTO: 0.3 % (ref 0–0.5)
KETONES UR QL STRIP: NEGATIVE
LEUKOCYTE ESTERASE UR QL STRIP.AUTO: NEGATIVE
LIPASE SERPL-CCNC: 22 U/L (ref 13–60)
LYMPHOCYTES # BLD AUTO: 2.17 10*3/MM3 (ref 0.7–3.1)
LYMPHOCYTES NFR BLD AUTO: 32.2 % (ref 19.6–45.3)
MCH RBC QN AUTO: 29.2 PG (ref 26.6–33)
MCHC RBC AUTO-ENTMCNC: 33.2 G/DL (ref 31.5–35.7)
MCV RBC AUTO: 87.9 FL (ref 79–97)
MONOCYTES # BLD AUTO: 0.47 10*3/MM3 (ref 0.1–0.9)
MONOCYTES NFR BLD AUTO: 7 % (ref 5–12)
NEUTROPHILS NFR BLD AUTO: 4 10*3/MM3 (ref 1.7–7)
NEUTROPHILS NFR BLD AUTO: 59.3 % (ref 42.7–76)
NITRITE UR QL STRIP: NEGATIVE
NRBC BLD AUTO-RTO: 0 /100 WBC (ref 0–0.2)
PH UR STRIP.AUTO: 7.5 [PH] (ref 5–8)
PLATELET # BLD AUTO: 246 10*3/MM3 (ref 140–450)
PMV BLD AUTO: 10 FL (ref 6–12)
POTASSIUM SERPL-SCNC: 3.6 MMOL/L (ref 3.5–5.2)
PROT SERPL-MCNC: 7.2 G/DL (ref 6–8.5)
PROT UR QL STRIP: NEGATIVE
RBC # BLD AUTO: 4.8 10*6/MM3 (ref 3.77–5.28)
SODIUM SERPL-SCNC: 140 MMOL/L (ref 136–145)
SP GR UR STRIP: <=1.005 (ref 1–1.03)
UROBILINOGEN UR QL STRIP: NORMAL
WBC NRBC COR # BLD: 6.74 10*3/MM3 (ref 3.4–10.8)

## 2023-09-22 PROCEDURE — 81025 URINE PREGNANCY TEST: CPT | Performed by: EMERGENCY MEDICINE

## 2023-09-22 PROCEDURE — 74176 CT ABD & PELVIS W/O CONTRAST: CPT

## 2023-09-22 PROCEDURE — 99284 EMERGENCY DEPT VISIT MOD MDM: CPT

## 2023-09-22 PROCEDURE — 85025 COMPLETE CBC W/AUTO DIFF WBC: CPT | Performed by: EMERGENCY MEDICINE

## 2023-09-22 PROCEDURE — 81003 URINALYSIS AUTO W/O SCOPE: CPT | Performed by: EMERGENCY MEDICINE

## 2023-09-22 PROCEDURE — 83690 ASSAY OF LIPASE: CPT | Performed by: EMERGENCY MEDICINE

## 2023-09-22 PROCEDURE — 80053 COMPREHEN METABOLIC PANEL: CPT | Performed by: EMERGENCY MEDICINE

## 2023-09-22 RX ORDER — SODIUM CHLORIDE 0.9 % (FLUSH) 0.9 %
10 SYRINGE (ML) INJECTION AS NEEDED
Status: DISCONTINUED | OUTPATIENT
Start: 2023-09-22 | End: 2023-09-22 | Stop reason: HOSPADM

## 2023-09-22 RX ADMIN — SODIUM CHLORIDE 1000 ML: 9 INJECTION, SOLUTION INTRAVENOUS at 09:06

## 2023-09-22 NOTE — ED PROVIDER NOTES
EMERGENCY DEPARTMENT ENCOUNTER    Room Number:  36/36  Date of encounter:  9/22/2023  PCP: Loren Sutton APRN  Historian: Patient    Patient was placed in face mask during triage process. Patient was wearing facemask when I entered the room and throughout our encounter. I wore full protective equipment throughout this patient encounter including a face mask, eye protection, and gloves. Hand hygiene was performed before donning protective equipment and again following doffing of PPE after leaving the room.    HPI:  Chief Complaint: Back pain  A complete HPI/ROS/PMH/PSH/SH/FH are unobtainable due to: N/A   Context: Josue Castanon is a 33 y.o. female who presents to the ED c/o left flank pain that began about 6 days ago.  Sometimes pain radiates into the left abdomen and sometimes it switches sides over to the right.  She began to have some diarrhea yesterday with no black or bloody stools reported.  No nausea or vomiting.  No reported fevers or chest pain.  Denies prior history of similar.  No known trauma to the back.  No prior history of kidney stones.  Patient notes the diarrhea starts when she tries to eat something.      MEDICAL HISTORY REVIEW  Additional sources:  - Discussed/ obtained information from independent historians: Patient's mother who is at bedside    - External (non-ED) record review:   OB/GYN office note 9/1/2023 reviewed: Patient followed for dysplasia of cervix.    - Chronic or social conditions impacting care:       PAST MEDICAL HISTORY  Active Ambulatory Problems     Diagnosis Date Noted    Body mass index (BMI) of 19.0-19.9 in adult 10/03/2022    Breast lump on right side at 1 o'clock position 10/04/2022    Elevated TSH 10/04/2022    Foreign body in skin of abdomen 08/10/2023     Resolved Ambulatory Problems     Diagnosis Date Noted    No Resolved Ambulatory Problems     Past Medical History:   Diagnosis Date    Allergic rhinitis     Eczema          PAST SURGICAL HISTORY  Past Surgical  History:   Procedure Laterality Date    WISDOM TOOTH EXTRACTION           FAMILY HISTORY  Family History   Problem Relation Age of Onset    No Known Problems Mother     No Known Problems Father     Diabetes Maternal Aunt     Diabetes Paternal Uncle          SOCIAL HISTORY  Social History     Socioeconomic History    Marital status: Single   Tobacco Use    Smoking status: Never    Smokeless tobacco: Never   Vaping Use    Vaping Use: Never used   Substance and Sexual Activity    Alcohol use: Never    Drug use: Never    Sexual activity: Yes     Partners: Male         ALLERGIES  Cefaclor        REVIEW OF SYSTEMS  Review of Systems     All systems reviewed and negative except for those discussed in HPI.       PHYSICAL EXAM    I have reviewed the triage vital signs and nursing notes.    ED Triage Vitals [09/22/23 0803]   Temp Heart Rate Resp BP SpO2   97.6 °F (36.4 °C) 106 16 -- 99 %      Temp src Heart Rate Source Patient Position BP Location FiO2 (%)   Tympanic Monitor -- -- --       Physical Exam    Physical Exam   Constitutional: No distress.   HENT:  Head: Normocephalic and atraumatic.   Oropharynx: Mucous membranes are moist.   Eyes: No scleral icterus.   Neck: Neck supple.   Cardiovascular: Pink, warm and well-perfused throughout  Pulmonary/Chest: No respiratory distress.  No tachypnea or increased work of breathing  Abdominal: Soft.  No rebound or rigidity.  No focal tenderness to palpation.  Negative Sinha's.  Musculoskeletal: Moves all extremities equally.   Neurological: Alert.  Speech fluent and easily intelligible  Skin: Skin is pink, warm, and dry. No pallor.   Psychiatric: Mood and affect normal.   Nursing note and vitals reviewed.    LAB RESULTS  Recent Results (from the past 24 hour(s))   Urinalysis With Microscopic If Indicated (No Culture) - Urine, Clean Catch    Collection Time: 09/22/23  8:27 AM    Specimen: Urine, Clean Catch   Result Value Ref Range    Color, UA Yellow Yellow, Straw    Appearance,  UA Clear Clear    pH, UA 7.5 5.0 - 8.0    Specific Gravity, UA <=1.005 1.005 - 1.030    Glucose, UA Negative Negative    Ketones, UA Negative Negative    Bilirubin, UA Negative Negative    Blood, UA Negative Negative    Protein, UA Negative Negative    Leuk Esterase, UA Negative Negative    Nitrite, UA Negative Negative    Urobilinogen, UA 0.2 E.U./dL 0.2 - 1.0 E.U./dL   Pregnancy, Urine - Urine, Clean Catch    Collection Time: 09/22/23  8:27 AM    Specimen: Urine, Clean Catch   Result Value Ref Range    HCG, Urine QL Negative Negative   Comprehensive Metabolic Panel    Collection Time: 09/22/23  8:41 AM    Specimen: Blood   Result Value Ref Range    Glucose 104 (H) 65 - 99 mg/dL    BUN 8 6 - 20 mg/dL    Creatinine 0.65 0.57 - 1.00 mg/dL    Sodium 140 136 - 145 mmol/L    Potassium 3.6 3.5 - 5.2 mmol/L    Chloride 103 98 - 107 mmol/L    CO2 24.0 22.0 - 29.0 mmol/L    Calcium 9.5 8.6 - 10.5 mg/dL    Total Protein 7.2 6.0 - 8.5 g/dL    Albumin 5.0 3.5 - 5.2 g/dL    ALT (SGPT) 13 1 - 33 U/L    AST (SGOT) 20 1 - 32 U/L    Alkaline Phosphatase 65 39 - 117 U/L    Total Bilirubin 0.4 0.0 - 1.2 mg/dL    Globulin 2.2 gm/dL    A/G Ratio 2.3 g/dL    BUN/Creatinine Ratio 12.3 7.0 - 25.0    Anion Gap 13.0 5.0 - 15.0 mmol/L    eGFR 117.9 >60.0 mL/min/1.73   Lipase    Collection Time: 09/22/23  8:41 AM    Specimen: Blood   Result Value Ref Range    Lipase 22 13 - 60 U/L   CBC Auto Differential    Collection Time: 09/22/23  8:41 AM    Specimen: Blood   Result Value Ref Range    WBC 6.74 3.40 - 10.80 10*3/mm3    RBC 4.80 3.77 - 5.28 10*6/mm3    Hemoglobin 14.0 12.0 - 15.9 g/dL    Hematocrit 42.2 34.0 - 46.6 %    MCV 87.9 79.0 - 97.0 fL    MCH 29.2 26.6 - 33.0 pg    MCHC 33.2 31.5 - 35.7 g/dL    RDW 11.7 (L) 12.3 - 15.4 %    RDW-SD 37.6 37.0 - 54.0 fl    MPV 10.0 6.0 - 12.0 fL    Platelets 246 140 - 450 10*3/mm3    Neutrophil % 59.3 42.7 - 76.0 %    Lymphocyte % 32.2 19.6 - 45.3 %    Monocyte % 7.0 5.0 - 12.0 %    Eosinophil % 0.6 0.3  - 6.2 %    Basophil % 0.6 0.0 - 1.5 %    Immature Grans % 0.3 0.0 - 0.5 %    Neutrophils, Absolute 4.00 1.70 - 7.00 10*3/mm3    Lymphocytes, Absolute 2.17 0.70 - 3.10 10*3/mm3    Monocytes, Absolute 0.47 0.10 - 0.90 10*3/mm3    Eosinophils, Absolute 0.04 0.00 - 0.40 10*3/mm3    Basophils, Absolute 0.04 0.00 - 0.20 10*3/mm3    Immature Grans, Absolute 0.02 0.00 - 0.05 10*3/mm3    nRBC 0.0 0.0 - 0.2 /100 WBC       Ordered the above labs and independently reviewed the results.        RADIOLOGY  CT Abdomen Pelvis Stone Protocol    Result Date: 9/22/2023  CT ABDOMEN AND PELVIS WITHOUT CONTRAST  HISTORY: Left flank pain.  TECHNIQUE: Axial CT images of the abdomen and pelvis were obtained without administration of intravenous contrast. The patient was given oral contrast. Coronal and sagittal reformats were obtained.  COMPARISON: None  FINDINGS: 3 mm calculus at the lower pole of the left kidney. No hydronephrosis. No calculus is identified along the course of the left ureter. No right-sided renal calculi or hydronephrosis.  Noncontrast attenuation of the liver is normal. The gallbladder, spleen and the pancreas is normal. Bilateral adrenal glands are normal. The uterus is anteverted and normal. No abnormal adnexal mass.  The small and large bowel loops demonstrate normal caliber. There is no pathological retroperitoneal lymphadenopathy. No pleural or pericardial effusion.      3 mm nonobstructing left renal nephrolith.  Radiation dose reduction techniques were utilized, including automated exposure control and exposure modulation based on body size.        I ordered the above noted radiological studies. Reviewed by me and discussed with radiologist.  See dictation for official radiology interpretation.      PROCEDURES    Procedures        MEDICATIONS GIVEN IN ER    Medications   sodium chloride 0.9 % flush 10 mL (has no administration in time range)   sodium chloride 0.9 % bolus 1,000 mL (0 mL Intravenous Stopped 9/22/23  0946)         PROGRESS, DATA ANALYSIS, CONSULTS, AND MEDICAL DECISION MAKING    My differential diagnosis for back pain includes but is not limited to:  Musculoskeletal strain, contusion, retroperitoneal hematoma, disc protrusion, vertebral fracture, transverse process fracture, rib fracture, facet syndrome, sacroiliac joint strain, sciatica, renal injury, splenic injury, pancreatic injury, osteoarthritis, lumbar spondylosis, spinal stenosis, ankylosing spondylitis, sacroiliac joint inflammation, pancreatitis, perforated peptic ulcer, diverticulitis, endometriosis, chronic PID, epidural abscess, osteomyelitis, retroperitoneal abscess, pyelonephritis, pneumonia, subphrenic abscess, tuberculosis, neurofibroma, meningioma, multiple myeloma, lymphoma, metastatic cancer, primary cancer, AAA, aortic dissection, spinal ischemia, referred pain, ureterolithiasis      All labs have been independently reviewed by me.  All radiology studies have been reviewed by me and discussed with radiologist dictating the report.   EKG's independently viewed and interpreted by me.  Discussion below represents my analysis of pertinent findings related to patient's condition, differential diagnosis, treatment plan and final disposition.      ED Course as of 09/22/23 1129   Fri Sep 22, 2023   0831 Patient agreeable with plan for laboratory evaluation and rehydration.  We discussed pros and cons as well as risks and benefits of CT abdomen pelvis.  She would like to move forward with this test.  Declines offer for pain and nausea medication at this time. [RS]   0910 HCG, Urine QL: Negative [RS]   0910 Specific Gravity, UA: <=1.005 [RS]   0910 Leukocytes, UA: Negative [RS]   0910 Nitrite, UA: Negative [RS]   0910 WBC: 6.74 [RS]   0910 Hemoglobin: 14.0 [RS]   0910 Platelets: 246 [RS]   0910 Immature Grans, Absolute: 0.02 [RS]   0911 RADIOLOGY      Study: Noncontrast CT abdomen pelvis  Findings: No appreciable hydronephrosis.  I independently  viewed and interpreted these images contemporaneously with treatment.    [RS]   1055 Lipase: 22 [RS]   1055 BUN: 8 [RS]   1055 Creatinine: 0.65 [RS]   1055 Sodium: 140 [RS]   1055 Potassium: 3.6 [RS]   1128 Reviewed laboratory and CT findings with patient and her mother.  Patient feels improved at this time.  No acute life threats identified.  Intrarenal stone unlikely source of patient's discomfort but possible.  Recommend symptomatic treatment at home with Tylenol and ibuprofen and follow-up with PCP.  Patient agreeable. [RS]      ED Course User Index  [RS] Kartik Zamora MD       AS OF 11:29 EDT VITALS:    BP - 106/72  HR - 73  TEMP - 97.6 °F (36.4 °C) (Tympanic)  O2 SATS - 96%        DIAGNOSIS  Final diagnoses:   Acute left flank pain   Nephrolithiasis   Diarrhea, unspecified type         DISPOSITION  DISCHARGE    Patient discharged in stable condition.    Reviewed implications of results, diagnosis, meds, responsibility to follow up, warning signs and symptoms of possible worsening, potential complications and reasons to return to ER.    Patient/Family voiced understanding of above instructions.    Discussed plan for discharge, as there is no emergent indication for admission. Patient referred to primary care provider for regular health maintenance. Pt/family is agreeable and understands need for follow up and possible repeat testing.  Pt is aware that discharge does not mean that nothing is wrong but it indicates no emergency is present that requires admission and they must continue care with follow-up as given below or physician of their choice.     FOLLOW-UP  Loren Sutton, APRN  [address] [facility] [address] [phone]    Schedule an appointment as soon as possible for a visit in 3 days  If symptoms fail to improve         Medication List      No changes were made to your prescriptions during this visit.           Please note that portions of this were completed with a voice recognition  program.       Note Disclaimer: At [facility], we believe that sharing information builds trust and better relationships. You are receiving this note because you are receiving care at [facility] or recently visited. It is possible you will see health information before a provider has talked with you about it. This kind of information can be easy to misunderstand. To help you fully understand what it means for your health, we urge you to discuss this note with your provider.     Kartik Zamora MD  09/22/23 1128

## 2023-09-22 NOTE — ED NOTES
Back pain x 6 days - described as bilat flank.  Every time she eats she has diarrhea.  No urinary issues.

## 2023-10-20 ENCOUNTER — OFFICE VISIT (OUTPATIENT)
Dept: OBSTETRICS AND GYNECOLOGY | Facility: CLINIC | Age: 33
End: 2023-10-20
Payer: COMMERCIAL

## 2023-10-20 VITALS
BODY MASS INDEX: 18.06 KG/M2 | HEIGHT: 60 IN | SYSTOLIC BLOOD PRESSURE: 129 MMHG | DIASTOLIC BLOOD PRESSURE: 89 MMHG | HEART RATE: 75 BPM | WEIGHT: 92 LBS

## 2023-10-20 DIAGNOSIS — R10.9 ABDOMINAL CRAMPING: Primary | ICD-10-CM

## 2023-10-24 LAB
A VAGINAE DNA VAG QL NAA+PROBE: NORMAL SCORE
BVAB2 DNA VAG QL NAA+PROBE: NORMAL SCORE
C ALBICANS DNA VAG QL NAA+PROBE: NEGATIVE
C GLABRATA DNA VAG QL NAA+PROBE: NEGATIVE
C TRACH DNA VAG QL NAA+PROBE: NEGATIVE
MEGA1 DNA VAG QL NAA+PROBE: NORMAL SCORE
N GONORRHOEA DNA VAG QL NAA+PROBE: NEGATIVE
T VAGINALIS DNA VAG QL NAA+PROBE: NEGATIVE

## 2024-03-01 ENCOUNTER — OFFICE VISIT (OUTPATIENT)
Dept: OBSTETRICS AND GYNECOLOGY | Facility: CLINIC | Age: 34
End: 2024-03-01
Payer: COMMERCIAL

## 2024-03-01 VITALS
BODY MASS INDEX: 18.65 KG/M2 | SYSTOLIC BLOOD PRESSURE: 135 MMHG | DIASTOLIC BLOOD PRESSURE: 87 MMHG | WEIGHT: 95 LBS | HEIGHT: 60 IN | HEART RATE: 88 BPM

## 2024-03-01 DIAGNOSIS — N89.8 VAGINAL DISCHARGE: ICD-10-CM

## 2024-03-01 DIAGNOSIS — N94.10 FEMALE DYSPAREUNIA: ICD-10-CM

## 2024-03-01 DIAGNOSIS — Z01.419 ENCOUNTER FOR GYNECOLOGICAL EXAMINATION: Primary | ICD-10-CM

## 2024-03-01 NOTE — PROGRESS NOTES
"Chief Complaint  Annual Exam- Pap- repeat    Subjective        Josue Castanon presents to [facility]  History of Present Illness  Patient is a 34-year-old female that presents for gynecological exam.  She underwent a LEEP procedure 1 year ago due to high-grade dysplasia.  She had a repeat Pap smear in September that was normal and negative for HPV.  She does report that since that procedure, she notices more lower pelvic pain after orgasm.  She feels that it could be related to the LEEP procedure.  She also reports just overall increase in pain with her menstrual cycle since the LEEP procedure.  She declines any contraception.  She does report an increase in vaginal discharge that is more mucus-like.  Objective   Vital Signs:  /87   Pulse 88   Ht 152.4 cm (60\")   Wt 43.1 kg (95 lb)   BMI 18.55 kg/m²   Estimated body mass index is 18.55 kg/m² as calculated from the following:    Height as of this encounter: 152.4 cm (60\").    Weight as of this encounter: 43.1 kg (95 lb).             Physical Exam  Vitals reviewed. Exam conducted with a chaperone present.   Constitutional:       Appearance: She is well-developed.   Cardiovascular:      Rate and Rhythm: Normal rate and regular rhythm.   Pulmonary:      Effort: Pulmonary effort is normal.      Breath sounds: Normal breath sounds.   Chest:   Breasts:     Right: No inverted nipple, mass, nipple discharge, skin change or tenderness.      Left: No inverted nipple, mass, nipple discharge, skin change or tenderness.   Abdominal:      General: There is no distension.      Palpations: Abdomen is soft.      Tenderness: There is no abdominal tenderness.   Genitourinary:     Labia:         Right: No rash, tenderness, lesion or injury.         Left: No rash, tenderness, lesion or injury.       Vagina: Normal.      Cervix: Normal.      Uterus: Normal.       Adnexa:         Right: No mass, tenderness or fullness.          Left: No mass, tenderness " or fullness.     Neurological:      Mental Status: She is alert.        Result Review :                     Assessment and Plan     Diagnoses and all orders for this visit:    1. Encounter for gynecological examination (Primary)  -     IGP, Apt HPV,rfx 16 / 18,45    2. Vaginal discharge  -     NuSwab VG+ - Swab, Vagina    3. Female dyspareunia  -     Ambulatory Referral to Physical Therapy Pelvic Floor    Repeat Pap smear was collected.  If this Pap smear is negative, I did advise patient to follow-up in 1 year for annual exam.  I did discuss my recommendations for pelvic floor therapy given her complaints of pelvic pain and pain with intercourse and she wishes for the referral.  She may follow-up with me in 1 year or sooner if needed.         Follow Up     Return in about 1 year (around 3/1/2025) for gynecological exam.  Patient was given instructions and counseling regarding her condition or for health maintenance advice. Please see specific information pulled into the AVS if appropriate.

## 2024-03-06 LAB
CYTOLOGIST CVX/VAG CYTO: NORMAL
CYTOLOGY CVX/VAG DOC CYTO: NORMAL
CYTOLOGY CVX/VAG DOC THIN PREP: NORMAL
DX ICD CODE: NORMAL
HPV I/H RISK 4 DNA CVX QL PROBE+SIG AMP: NEGATIVE
Lab: NORMAL
Lab: NORMAL
OTHER STN SPEC: NORMAL
STAT OF ADQ CVX/VAG CYTO-IMP: NORMAL

## 2024-03-20 ENCOUNTER — HOSPITAL ENCOUNTER (OUTPATIENT)
Dept: PHYSICAL THERAPY | Facility: HOSPITAL | Age: 34
Setting detail: THERAPIES SERIES
Discharge: HOME | End: 2024-03-20
Payer: COMMERCIAL

## 2024-03-20 DIAGNOSIS — N94.10 DYSPAREUNIA IN FEMALE: ICD-10-CM

## 2024-03-20 DIAGNOSIS — R10.2 PELVIC PAIN: Primary | ICD-10-CM

## 2024-03-20 PROCEDURE — 97535 SELF CARE MNGMENT TRAINING: CPT

## 2024-03-20 PROCEDURE — 97530 THERAPEUTIC ACTIVITIES: CPT

## 2024-03-20 PROCEDURE — 97163 PT EVAL HIGH COMPLEX 45 MIN: CPT

## 2024-03-20 NOTE — THERAPY EVALUATION
Outpatient Physical Therapy Pelvic Health Initial Evaluation  [facility]     Patient Name: Josue Castanon  : 1990  MRN: [id number]  Today's Date: 3/20/2024        Visit Date: 2024    Patient Active Problem List   Diagnosis    Body mass index (BMI) of 19.0-19.9 in adult    Breast lump on right side at 1 o'clock position    Elevated TSH    Foreign body in skin of abdomen        Past Medical History:   Diagnosis Date    Allergic rhinitis     Eczema         Past Surgical History:   Procedure Laterality Date    WISDOM TOOTH EXTRACTION           Visit Dx:    ICD-10-CM ICD-9-CM   1. Pelvic pain  R10.2 [id number]   2. Dyspareunia in female  N94.10 625.0        Patient History       Row Name 24 1500             History    Occupation/sports/leisure activities [employer], night shift  -CC         Daily Activities    Primary Language English  -CC      Teaching needs identified Management of Condition;Home Exercise Program  -CC      Does patient have problems with the following? Anxiety  -CC      Barriers to learning None  -CC      Recommended Referrals Psychology  discussed with pt  -CC      Pt Participated in POC and Goals Yes  -CC                User Key  (r) = Recorded By, (t) = Taken By, (c) = Cosigned By      Initials Name Provider Type    CC Luciana Chua PT Physical Therapist                     Pelvic Health       Row Name 24 1500             Subjective    Patient Reason for Visit Pelvic Pain  -CC      Brief Description of Chief Complaint Josue Castanon is a 34 y.o. female who presents today with dyspareunia. Reports pain specifically with orgasm, reports the pain is cramping and more left sided in nature. LEEP Procedure approx 1 year ago. Reports the pain/problems started after this. Denies pain with GYN pelvic exam/speculum exam. Reports the intensity of her orgasms as decreased since surgery. PMH includes LEEP procedure approx 1 year ago, seeing urogynecologist soon, also gets some back  pain occassionally with the pelvic cramping  -CC      Surgical History LEEP Procedure x 1 year ago  -CC      Patient Participated in POC and Goals Yes  -CC         Daily Activities    Primary Language English  -CC      Teaching needs identified Management of Condition;Home Exercise Program  -CC      Does patient have problems with the following? Anxiety  -CC      Barriers to learning None  -CC      Pt Participated in POC and Goals Yes  -CC         Urinary/Bowel History    Difficulty starting stream no  -CC      Stress incontinence a few months go had some issues with NICHELLE when sneezing - has only happened 2x; also experiencing NICHELLE with laughing occassionally  -CC      Urgency none  -CC      Nocturia (times per night) 0  -CC      Daytime frequency (hours) goes about every 1-2 hours  -CC      Fluid Intake 2 16 ounce bottles per day  -CC         Pregnancy/Sexual History    Number of Pregnancies 0  -CC      Pain with initial penetration No  -CC      Pain with deep penetration No  -CC      Sexual health Reports there is no pain with penetration, reports all of her pain is during and after orgasm  -CC         Pain Assessment    Pain Score 2  dull/cramping/aching  -CC         Pelvic Floor Muscle    Patient/Parent/Guardian Consented to Internal Pelvic Floor Exam Yes  -CC      Strength (Right) 4: Strong contraction  -CC      Strength (Left) 4: Strong contraction  -CC      Symmetry of Sustained Maximal Contraction Symmetrical  -CC      Endurance (Ability to Hold Maximal Contraction) --  NT  -CC      # of Reps of Maximal Contractions while Maintaining Endurance and Strength --  NT  -CC      Fast Contraction (# of 1 sec contractions performed) --  NT  -CC      External pelvic floor palpation Perineal Body  -CC      Perineal Body Tender  decreased mobility  -CC      3rd Layer Tone/Internal Palpation TTP (4-5/10 pain) R OI, R lateral and posterior PFM, more mild TTP L OI/lateral PFM  -CC      Internal Pelvic Floor Comments Pt  requires cues to relax during exam, difficulty relaxing pelvic floor following contraction, requires max cues  -CC      External Pelvic Floor Comments pt visibly tense for pelvic exam, needs frequent cues for relaxation, pt voices anxiety about pelvic exam but still wish to proceed with internal exam.  -CC         Abdominal Assessment    Breathing Pattern paradoxical  -CC         Education Provided On:    Education Points Self-Mobilization of soft tissue;Behavioral modifications;HEP;Vagina/rectal stretching;Provides resources for patient  -CC      Method of Delivery Verbal;Demonstration;Written  -CC      Education Provided To Patient  -CC      Level of Understanding Teach back education performed;Verbalized;Demonstrated  -CC      HEP Comments Access Code [email or web address]  -CC      Education Comments see exercise flowsheet  -CC         Outcome Measures    Outcome Measure Options --  was given wrong questionnaire at , incomplete  -CC                User Key  (r) = Recorded By, (t) = Taken By, (c) = Cosigned By      Initials Name Provider Type    CC Luciana Chua, PT Physical Therapist                                  PT Assessment/Plan       Row Name 03/20/24 1600          PT Assessment    Functional Limitations Limitation in home management;Limitations in functional capacity and performance;Performance in leisure activities  -CC     Impairments Poor body mechanics;Pain;Impaired flexibility;Impaired muscle length;Coordination  -CC     Assessment Comments Josue Castanon is a 34 y.o. female referred to physical therapy for dysparunia. She presents with an evolving clinical presentation, along with no remarkable comorbidities and personal factors of hx of LEEP procedure about 1 year ago following which she experienced onset of her symptoms  that may impact her progress in the plan of care. Pt presents today with decreased perineal mobility, hypotonicity of 3rd lay PFM (specifically R OI, R lateral PFM, R  posterior PFM, and L lateral PFM/OI). Pt demos 4/5 PFM strength, but significant difficulty relaxing from a pelvic floor contraction . She also demos paradoxical breathing, and general increased body tension/stress. Her signs and symptoms are consistent with referring diagnosis. The previous impairments limit her ability to participate in a pelvic exam without discomfort, and participate in sexual intercourse without discomfort. Pt will benefit from skilled PT to address the previous impairments and return to OF.  -CC     Please refer to paper survey for additional self-reported information No  -CC     Rehab Potential Good  -CC     Patient/caregiver participated in establishment of treatment plan and goals Yes  -CC     Patient would benefit from skilled therapy intervention Yes  -CC        PT Plan    PT Frequency 1x/week  -CC     Predicted Duration of Therapy Intervention (PT) 8-12 visits  -CC     Planned CPT's? PT EVAL HIGH COMPLEXITY: 06028;PT THER PROC EA 15 MIN: 30167;PT RE-EVAL: 54532;PT THER ACT EA 15 MIN: 86851;PT MANUAL THERAPY EA 15 MIN: 72983;PT NEUROMUSC RE-EDUCATION EA 15 MIN: 39021;PT GAIT TRAINING EA 15 MIN: 08823;PT SELF CARE/HOME MGMT/TRAIN EA 15: 96093;PT HOT OR COLD PACK TREAT MCARE  -CC     PT Plan Comments Next visit: response to HEP, add pelvic wand (maybe manual?) provide pt with mental health counselor contact; add happy baby, perineal stretching, child's pose, fig 4 or Z sit, adductor  -CC               User Key  (r) = Recorded By, (t) = Taken By, (c) = Cosigned By      Initials Name Provider Type    CC Luciana Chua, PT Physical Therapist                       OP Exercises       Row Name 03/20/24 1600 03/20/24 1500          Subjective    Patient Reason for Visit -- Pelvic Pain  -CC     Brief Description of Chief Complaint -- Josue Castanon is a 34 y.o. female who presents today with dyspareunia. Reports pain specifically with orgasm, reports the pain is cramping and more left sided  in nature. LEEP Procedure approx 1 year ago. Reports the pain/problems started after this. Denies pain with GYN pelvic exam/speculum exam. Reports the intensity of her orgasms as decreased since surgery. PMH includes LEEP procedure approx 1 year ago, seeing urogynecologist soon, also gets some back pain occassionally with the pelvic cramping  -CC     Surgical History -- LEEP Procedure x 1 year ago  -CC     Patient Participated in POC and Goals -- Yes  -CC        Total Minutes    61910 - PT Therapeutic Activity Minutes 15  -CC --        Exercise 1    Exercise Name 1 diaphragmatic breathing with pelvic floor lengthening  -CC --     Cueing 1 Verbal  -CC --     Reps 1 10  -CC --     Additional Comments increased time to complete with internal cues for form and full pelvic floor relaxation  -CC --        Exercise 2    Exercise Name 2 discussed body scan and how to perform at home  -CC --     Cueing 2 Verbal  -CC --        Exercise 3    Exercise Name 3 Education on PFM anatomy, eval findings, POC, role of PF therapy  -CC --     Cueing 3 Verbal  -CC --        Exercise 4    Exercise Name 4 education on role of mental health therapy and recommendation of psycology hand in hand with PF PT to address traumatic aspects and anxiety  -CC --     Cueing 4 Verbal  -CC --     Additional Comments pt receptive to discussion  -CC --        Exercise 5    Exercise Name 5 education on PF anatomy, as well as encouraged pt to reach back out to OBGYN to discuss her procedure more in depth and answer questions regarding anatomical outcomes, topical estrogen, lubrication, organ changes, vaginal fluid/discharge.  -CC --     Cueing 5 Verbal  -CC --        Exercise 6    Exercise Name 6 initiated discussion of pelvic wand for home, will continue next session  -CC --     Cueing 6 Verbal  -CC --               User Key  (r) = Recorded By, (t) = Taken By, (c) = Cosigned By      Initials Name Provider Type    CC Luciana Chua, PT Physical  Therapist                                 PT OP Goals       Row Name 03/20/24 1700          PT Short Term Goals    STG Date to Achieve 04/19/24  -CC     STG 1 Pt will be independent initial HEP to address full body and pelvic floor relaxation.  -CC     STG 1 Progress New  -CC     STG 2 Pt will report </=2/10 pain with pelvic exam.  -CC     STG 2 Progress New  -CC     STG 3 Pt will demo ability to fully relax pelvic floor following contraction without cueing.  -CC     STG 3 Progress New  -CC        Long Term Goals    LTG Date to Achieve 05/19/24  -CC     LTG 1 Pt will be independent with comprehensive HEP to manage condition long term.  -CC     LTG 1 Progress New  -CC     LTG 2 Pt will be able to tolerate a pelvic exam with 0/10 pelvic floor pain.  -CC     LTG 2 Progress New  -CC     LTG 3 Pt will report </= 1/10 pelvic pain with intercourse.  -CC     LTG 3 Progress New  -CC        Time Calculation    PT Goal Re-Cert Due Date 06/18/24  -CC               User Key  (r) = Recorded By, (t) = Taken By, (c) = Cosigned By      Initials Name Provider Type    Luciana Vivar, PT Physical Therapist                    Therapy Education  Education Details: education on PF anatomy, as well as encouraged pt to reach back out to OBGYN to discuss her procedure more in depth and answer questions regarding anatomical outcomes, topical estrogen, lubrication, organ changes, vaginal fluid/discharge; education on role of mental health therapy and recommendation of psycology hand in hand with PF PT to address traumatic aspects and anxiety; Education on PFM anatomy, eval findings, POC, role of PF therapy; initiated discussion of pelvic wand for home.  Given: HEP, Symptoms/condition management, Pain management  Program: New  How Provided: Verbal, Demonstration, Written  Provided to: Patient  Level of Understanding: Verbalized  89146 - PT Self Care/Mgmt Minutes: 25               Time Calculation:   Start Time: 1525  Stop Time:  1644  Time Calculation (min): 79 min  Total Timed Code Minutes- PT: 40 minute(s)  Timed Charges  14367 - PT Therapeutic Activity Minutes: 15  94553 - PT Self Care/Mgmt Minutes: 25  Untimed Charges  PT Eval/Re-eval Minutes: 39  Total Minutes  Timed Charges Total Minutes: 40  Untimed Charges Total Minutes: 39   Total Minutes: 79  Therapy Charges for Today       Code Description Service Date Service Provider Modifiers Qty    84450627214 HC PT THERAPEUTIC ACT EA 15 MIN 3/20/2024 Luciana Chua, PT GP 1    09304100537 HC PT SELF CARE/MGMT/TRAIN EA 15 MIN 3/20/2024 Luciana Chua, PT GP 2    39168842774 HC PT EVAL HIGH COMPLEXITY 3 3/20/2024 Luciana Chua, PT GP 1                    Luciana Chua, PT  3/20/2024

## 2024-04-09 ENCOUNTER — HOSPITAL ENCOUNTER (OUTPATIENT)
Dept: PHYSICAL THERAPY | Facility: HOSPITAL | Age: 34
Setting detail: THERAPIES SERIES
Discharge: HOME | End: 2024-04-09
Payer: COMMERCIAL

## 2024-04-09 DIAGNOSIS — N94.10 DYSPAREUNIA IN FEMALE: ICD-10-CM

## 2024-04-09 DIAGNOSIS — R10.2 PELVIC PAIN: Primary | ICD-10-CM

## 2024-04-09 PROCEDURE — 97110 THERAPEUTIC EXERCISES: CPT

## 2024-04-09 PROCEDURE — 97530 THERAPEUTIC ACTIVITIES: CPT

## 2024-04-09 NOTE — THERAPY TREATMENT NOTE
Outpatient Physical Therapy Pelvic Health Treatment Note  [facility]     Patient Name: Josue Castanon  : 1990  MRN: [id number]  Today's Date: 2024        Visit Date: 2024    Visit Dx:    ICD-10-CM ICD-9-CM   1. Pelvic pain  R10.2 [id number]   2. Dyspareunia in female  N94.10 625.0       Patient Active Problem List   Diagnosis    Body mass index (BMI) of 19.0-19.9 in adult    Breast lump on right side at 1 o'clock position    Elevated TSH    Foreign body in skin of abdomen         Pelvic Health       Row Name 24 1500             Education Provided On:    Education Points Self-Mobilization of soft tissue;Behavioral modifications;HEP;Vagina/rectal stretching;Provides resources for patient  -CC      Method of Delivery Verbal;Demonstration;Written  -CC      Education Provided To Patient  -CC      Level of Understanding Teach back education performed;Verbalized;Demonstrated  -CC      HEP Comments updated HEP  -CC      Education Comments see flow sheet  -CC                User Key  (r) = Recorded By, (t) = Taken By, (c) = Cosigned By      Initials Name Provider Type    CC Luciana Chua, PT Physical Therapist                                  PT Assessment/Plan       Row Name 24 1500          PT Assessment    Assessment Comments Pt returns for first f/u visit since eval. Reports saw urogyn since last visit, and feeling positive about symptoms. Reports was ressured that some of her symptoms can be expected after LEEP and can be addressed with PF PT. Focus today on discussion of use of pelvic wand, due to anxiety surrounding internal exam, and will likely see improvements with self-manual work, pt agreeable. Progressed pelvic floor mobility exercises with breathwork. Plan to follow up in a few weeks to check in on wand, then likely follow up 1 month after HEP performance.  -CC        PT Plan    PT Plan Comments Next visit: review wand, may discuss adding sweeping techniques. Review  "stretches. Add adductor rock backs, deep squat.  -CC               User Key  (r) = Recorded By, (t) = Taken By, (c) = Cosigned By      Initials Name Provider Type    CC Luciana Chua, PT Physical Therapist                       OP Exercises       Row Name 04/09/24 1500             Total Minutes    22253 - PT Therapeutic Exercise Minutes 25  -CC      72321 - PT Therapeutic Activity Minutes 15  -CC         Exercise 1    Exercise Name 1 diaphragmatic breathing with pelvic floor lengthening  -CC      Cueing 1 Verbal  -CC      Reps 1 reviewed  -CC         Exercise 2    Exercise Name 2 happy baby with breathing  -CC      Cueing 2 Verbal  -CC      Reps 2 10 breaths  -CC      Additional Comments \"breathe into butt\"  -CC         Exercise 3    Exercise Name 3 Child's pose with brething  -CC      Cueing 3 Verbal  -CC      Reps 3 10 breaths  -CC      Time 3 hips slight IR  -CC         Exercise 4    Exercise Name 4 Z sit/mermaid  -CC      Cueing 4 Verbal  -CC      Sets 4 1 set  ea way  -CC      Reps 4 10 ea way with alt overhead reach  -CC         Exercise 5    Exercise Name 5 education and demo on pelvic model on use of pelvic wand -discussed pin and hold  -CC      Cueing 5 Verbal  -CC                User Key  (r) = Recorded By, (t) = Taken By, (c) = Cosigned By      Initials Name Provider Type    Luciana Vivar, PT Physical Therapist                                 PT OP Goals       Row Name 04/09/24 1500          PT Short Term Goals    STG Date to Achieve 04/19/24  -CC     STG 1 Pt will be independent initial HEP to address full body and pelvic floor relaxation.  -CC     STG 1 Progress Ongoing  -CC     STG 2 Pt will report </=2/10 pain with pelvic exam.  -CC     STG 2 Progress Ongoing  -CC     STG 3 Pt will demo ability to fully relax pelvic floor following contraction without cueing.  -CC     STG 3 Progress Ongoing  -CC        Long Term Goals    LTG Date to Achieve 05/19/24  -CC     LTG 1 Pt will be " independent with comprehensive HEP to manage condition long term.  -CC     LTG 1 Progress Ongoing  -CC     LTG 2 Pt will be able to tolerate a pelvic exam with 0/10 pelvic floor pain.  -CC     LTG 2 Progress Ongoing  -CC     LTG 3 Pt will report </= 1/10 pelvic pain with intercourse.  -CC     LTG 3 Progress Ongoing  -CC               User Key  (r) = Recorded By, (t) = Taken By, (c) = Cosigned By      Initials Name Provider Type    CC Luciana Chua, PT Physical Therapist                                    Time Calculation:   Start Time: 1447  Stop Time: 1527  Time Calculation (min): 40 min  Total Timed Code Minutes- PT: 40 minute(s)  Timed Charges  95187 - PT Therapeutic Exercise Minutes: 25  79838 - PT Therapeutic Activity Minutes: 15  Total Minutes  Timed Charges Total Minutes: 40   Total Minutes: 40  Therapy Charges for Today       Code Description Service Date Service Provider Modifiers Qty    59815280510  PT THER PROC EA 15 MIN 4/9/2024 Luciana Chua, PT GP 2    [id number]  PT THERAPEUTIC ACT EA 15 MIN 4/9/2024 Luciana Chua, PT GP 1                      Luciana Chua PT  4/9/2024

## 2024-04-30 ENCOUNTER — HOSPITAL ENCOUNTER (OUTPATIENT)
Dept: PHYSICAL THERAPY | Facility: HOSPITAL | Age: 34
Discharge: HOME | End: 2024-04-30
Admitting: OBSTETRICS & GYNECOLOGY
Payer: COMMERCIAL

## 2024-04-30 DIAGNOSIS — N94.10 DYSPAREUNIA IN FEMALE: ICD-10-CM

## 2024-04-30 DIAGNOSIS — R10.2 PELVIC PAIN: Primary | ICD-10-CM

## 2024-04-30 PROCEDURE — 97110 THERAPEUTIC EXERCISES: CPT

## 2024-04-30 PROCEDURE — 97530 THERAPEUTIC ACTIVITIES: CPT

## 2024-04-30 NOTE — THERAPY PROGRESS REPORT/RE-CERT
Outpatient Physical Therapy Pelvic Health Progress Note  [facility]     Patient Name: Josue Castanon  : 1990  MRN: [id number]  Today's Date: 2024        Visit Date: 2024    Visit Dx:    ICD-10-CM ICD-9-CM   1. Pelvic pain  R10.2 [id number]   2. Dyspareunia in female  N94.10 625.0       Patient Active Problem List   Diagnosis    Body mass index (BMI) of 19.0-19.9 in adult    Breast lump on right side at 1 o'clock position    Elevated TSH    Foreign body in skin of abdomen                          PT Assessment/Plan       Row Name 24 1500          PT Assessment    Functional Limitations Limitation in home management;Limitations in functional capacity and performance;Performance in leisure activities  -CC     Impairments Poor body mechanics;Pain;Impaired flexibility;Impaired muscle length;Coordination  -CC     Assessment Comments Josue Castanon has been seen for 3 physical therapy sessions for dyspareunia.  Treatment has included therapeutic exercise, therapeutic activity, and patient education with home exercise program . Progress to physical therapy goals is fair. Pt has met 1/3 STG and 0/3 LTG. Pt continues to report ongoing pain with PF stretches, and declines internal exam/assessment/manual work today. Would prefer to trial pelvic wand for self-manual. Has not attempted IC since eval. Some limitations to progress include waiting on pelvic wand to arrive after being order and thus unable to start self-manual release at this point. She will benefit from continued skilled physical therapy to address remaining impairments and functional limitations.  -CC     Please refer to paper survey for additional self-reported information No  -CC     Rehab Potential Good  -CC     Patient/caregiver participated in establishment of treatment plan and goals Yes  -CC     Patient would benefit from skilled therapy intervention Yes  -CC        PT Plan    PT Frequency 1x/week  -CC     Predicted Duration of  "Therapy Intervention (PT) 2-4 more visits  -CC     PT Plan Comments Next visit: review response to pelvic wand. If pt still preferring pelvic wand vs. manual therapy, then likely plan to cont pelvic wand independently and follow up as needed in future. If open to manual therapies, may consider several more sessions to address hip and PFM mobility.  -CC               User Key  (r) = Recorded By, (t) = Taken By, (c) = Cosigned By      Initials Name Provider Type    Luciana Vivar, PT Physical Therapist                       OP Exercises       Row Name 04/30/24 1400             Subjective    Subjective Comments Reports pelvic wand just came in today, so hasn't had a chance to try it, busy weekend with her brother's wedding so didn't get around to [facility] as much.  -CC         Total Minutes    04758 - PT Therapeutic Exercise Minutes 25  -CC      73090 - PT Therapeutic Activity Minutes 15  -CC         Exercise 1    Exercise Name 1 diaphragmatic breathing with pelvic floor lengthening  -CC      Cueing 1 Verbal  -CC      Reps 1 10  -CC         Exercise 2    Exercise Name 2 happy baby with breathing  -CC      Cueing 2 Verbal  -CC      Reps 2 10 breaths  -CC      Additional Comments \"breathe into butt\"  -CC         Exercise 3    Exercise Name 3 Child's pose with brething  -CC      Cueing 3 Verbal  -CC      Reps 3 10 breaths  -CC      Time 3 hips slight IR  -CC         Exercise 4    Exercise Name 4 Z sit/mermaid  -CC      Cueing 4 Verbal  -CC      Sets 4 1 set  ea way  -CC      Reps 4 10 ea way with alt overhead reach  -CC         Exercise 5    Exercise Name 5 reviwed education and demo  use of pelvic wand, including appropriate frequency and expected outcomes  -CC      Cueing 5 Verbal  -CC         Exercise 6    Exercise Name 6 adductor rock backs  -CC      Cueing 6 Verbal  -CC      Reps 6 10 ea side  -CC      Time 6 5-10 s  -CC      Additional Comments qped  -CC         Exercise 7    Exercise Name 7 yoga deep squat  -CC "      Cueing 7 Verbal  -CC      Reps 7 10-12 breaths  -CC      Time 7 sitting on foam roll  -CC                User Key  (r) = Recorded By, (t) = Taken By, (c) = Cosigned By      Initials Name Provider Type    Luciana Vivar PT Physical Therapist                                 PT OP Goals       Row Name 04/30/24 1400          PT Short Term Goals    STG Date to Achieve 04/19/24  -CC     STG 1 Pt will be independent initial HEP to address full body and pelvic floor relaxation.  -CC     STG 1 Progress Met  -CC     STG 2 Pt will report </=2/10 pain with pelvic exam.  -CC     STG 2 Progress Ongoing  -CC     STG 3 Pt will demo ability to fully relax pelvic floor following contraction without cueing.  -CC     STG 3 Progress Ongoing  -CC        Long Term Goals    LTG Date to Achieve 05/19/24  -CC     LTG 1 Pt will be independent with comprehensive HEP to manage condition long term.  -CC     LTG 1 Progress Ongoing  -CC     LTG 2 Pt will be able to tolerate a pelvic exam with 0/10 pelvic floor pain.  -CC     LTG 2 Progress Ongoing  -CC     LTG 3 Pt will report </= 1/10 pelvic pain with intercourse.  -CC     LTG 3 Progress Ongoing  -CC               User Key  (r) = Recorded By, (t) = Taken By, (c) = Cosigned By      Initials Name Provider Type    Luciana Vivar PT Physical Therapist                                    Time Calculation:   Start Time: 1441  Stop Time: 1521  Time Calculation (min): 40 min  Total Timed Code Minutes- PT: 40 minute(s)  Timed Charges  03816 - PT Therapeutic Exercise Minutes: 25  88808 - PT Therapeutic Activity Minutes: 15  Total Minutes  Timed Charges Total Minutes: 40   Total Minutes: 40  Therapy Charges for Today       Code Description Service Date Service Provider Modifiers Qty    12779663271 HC PT THER PROC EA 15 MIN 4/30/2024 Luciana Chua, PT GP 2    30753385575 HC PT THERAPEUTIC ACT EA 15 MIN 4/30/2024 Luciana Chua, PT GP 1                      Luciana  Toma, PT  4/30/2024

## 2024-05-22 ENCOUNTER — HOSPITAL ENCOUNTER (OUTPATIENT)
Dept: PHYSICAL THERAPY | Facility: HOSPITAL | Age: 34
Discharge: HOME | End: 2024-05-22
Admitting: OBSTETRICS & GYNECOLOGY
Payer: COMMERCIAL

## 2024-05-22 DIAGNOSIS — N94.10 DYSPAREUNIA IN FEMALE: ICD-10-CM

## 2024-05-22 DIAGNOSIS — R10.2 PELVIC PAIN: Primary | ICD-10-CM

## 2024-05-22 PROCEDURE — 97530 THERAPEUTIC ACTIVITIES: CPT

## 2024-05-22 NOTE — THERAPY TREATMENT NOTE
Outpatient Physical Therapy Pelvic Health Treatment Note  [facility]     Patient Name: Josue Castanon  : 1990  MRN: [id number]  Today's Date: 2024        Visit Date: 2024    Visit Dx:    ICD-10-CM ICD-9-CM   1. Pelvic pain  R10.2 [id number]   2. Dyspareunia in female  N94.10 625.0       Patient Active Problem List   Diagnosis    Body mass index (BMI) of 19.0-19.9 in adult    Breast lump on right side at 1 o'clock position    Elevated TSH    Foreign body in skin of abdomen                          PT Assessment/Plan       Row Name 24 1400          PT Assessment    Assessment Comments Pt returns today with reports of improvements in mobiltiy and tolerance to pelvic wand. Pt reports has been using wand about 3-4x per week, noticed initial soreness lingering into next day, but now lingering soreness improving. Able to located TPs on R OI, but difficutly with rest of PFM. Reviewed use of pelvic wand with pelvic model, as well as demo of sweeping and strumming techniques for R lateral and posterior LA. Reviewed findings of internal exam with pt from initial evaluation, and that symptoms were more right sided in nature, so encourage pt to cont pelvic wand work with focus on R sided musculature. Reviewed healing time frames, and importance of consistent performance, reviewed. Pt continues to prefer self release with pelvic wand vs. therapist manaul release, so we discussed plan to continue current HEP for about 2 months, and then follow up to assess progress. Pt agreeable to POC.  -CC        PT Plan    PT Plan Comments Next visit: assess response to pelvic wand and self management. May consider repeat of internal exam to assess progress. Possible d/c at this time pending symptoms? Vs. manual therapy to PFM and hip girdle. If pt cont, consider adding core/hip/PFM strengthening - bridge with PFM, clam/rev clam, bird dog, etc.  -CC               User Key  (r) = Recorded By, (t) = Taken By, (c) =  Cosigned By      Initials Name Provider Type    Luciana Vivar, PT Physical Therapist                       OP Exercises       Row Name 05/22/24 1400             Subjective    Subjective Comments Reports has been using pelvic wand about 3-4 times per week, able to locate tender points on right side but not left  -CC         Subjective Pain    Subjective Pain Comment Arrival time 2:11 for 2:00 appt  -CC         Total Minutes    79054 - PT Therapeutic Activity Minutes 14  -CC         Exercise 5    Exercise Name 5 reviwed education and demo  use of pelvic wand on pelvic model, including appropriate frequency and expected outcomes  -CC      Cueing 5 Verbal  -CC                User Key  (r) = Recorded By, (t) = Taken By, (c) = Cosigned By      Initials Name Provider Type    CC Luciana Chua, PT Physical Therapist                                 PT OP Goals       Row Name 05/22/24 1400          PT Short Term Goals    STG Date to Achieve 04/19/24  -CC     STG 1 Pt will be independent initial HEP to address full body and pelvic floor relaxation.  -CC     STG 1 Progress Met  -CC     STG 2 Pt will report </=2/10 pain with pelvic exam.  -CC     STG 2 Progress Ongoing  -CC     STG 3 Pt will demo ability to fully relax pelvic floor following contraction without cueing.  -CC     STG 3 Progress Ongoing  -CC        Long Term Goals    LTG Date to Achieve 05/19/24  -CC     LTG 1 Pt will be independent with comprehensive HEP to manage condition long term.  -CC     LTG 1 Progress Ongoing  -CC     LTG 2 Pt will be able to tolerate a pelvic exam with 0/10 pelvic floor pain.  -CC     LTG 2 Progress Ongoing  -CC     LTG 3 Pt will report </= 1/10 pelvic pain with intercourse.  -CC     LTG 3 Progress Ongoing  -CC               User Key  (r) = Recorded By, (t) = Taken By, (c) = Cosigned By      Initials Name Provider Type    CC Luciana Chua, PT Physical Therapist                                    Time Calculation:    Start Time: 1413  Stop Time: 1427  Time Calculation (min): 14 min  Total Timed Code Minutes- PT: 14 minute(s)  Timed Charges  36958 - PT Therapeutic Activity Minutes: 14  Total Minutes  Timed Charges Total Minutes: 14   Total Minutes: 14  Therapy Charges for Today       Code Description Service Date Service Provider Modifiers Qty    73870705086  PT THERAPEUTIC ACT EA 15 MIN 5/22/2024 Luciana Chua, PT GP 1                      Luciana Chua, PT  5/22/2024

## 2024-07-24 ENCOUNTER — HOSPITAL ENCOUNTER (OUTPATIENT)
Dept: PHYSICAL THERAPY | Facility: HOSPITAL | Age: 34
Discharge: HOME | End: 2024-07-24
Admitting: OBSTETRICS & GYNECOLOGY
Payer: COMMERCIAL

## 2024-07-24 DIAGNOSIS — R10.2 PELVIC PAIN: Primary | ICD-10-CM

## 2024-07-24 DIAGNOSIS — N94.10 DYSPAREUNIA IN FEMALE: ICD-10-CM

## 2024-07-24 PROCEDURE — 97110 THERAPEUTIC EXERCISES: CPT

## 2024-07-24 PROCEDURE — 97530 THERAPEUTIC ACTIVITIES: CPT

## 2024-07-24 NOTE — THERAPY DISCHARGE NOTE
Outpatient Physical Therapy Pelvic Health Progress Note/Discharge Summary  [facility]     Patient Name: Josue Castanon  : 1990  MRN: [id number]  Today's Date: 2024        Visit Date: 2024    Visit Dx:    ICD-10-CM ICD-9-CM   1. Pelvic pain  R10.2 [id number]   2. Dyspareunia in female  N94.10 625.0   3. Body mass index (BMI) of 19.0-19.9 in adult  Z68.1 V85.1       Patient Active Problem List   Diagnosis    Body mass index (BMI) of 19.0-19.9 in adult    Breast lump on right side at 1 o'clock position    Elevated TSH    Foreign body in skin of abdomen         Pelvic Health       Row Name 24 1400             Pelvic Floor Muscle    Patient/Parent/Guardian Consented to Internal Pelvic Floor Exam --  declines today  -CC         Education Provided On:    Education Points HEP;Self-Mobilization of soft tissue  -CC      Method of Delivery Verbal;Demonstration;Written  -CC      Education Provided To Patient  -CC      Level of Understanding Teach back education performed;Verbalized;Demonstrated  -CC      HEP Comments updated HEP  -CC                User Key  (r) = Recorded By, (t) = Taken By, (c) = Cosigned By      Initials Name Provider Type    CC Luciana Chua PT Physical Therapist                   PT Ortho       Row Name 24 1400       Lumbar/SI Special Tests    Slump Test (Neural Tension) Bilateral:;Negative  -CC       Lumbosacral Palpation    Lumbosacral Palpation? No Tenderness/Abnormality  -CC       General ROM    GENERAL ROM COMMENTS lumbar and anurag hip ROM WFL with no pain  -CC       MMT (Manual Muscle Testing)    Rt Lower Ext Rt Hip Flexion;Rt Hip ABduction;Rt Knee Extension;Rt Knee Flexion;Rt Ankle Dorsiflexion  -CC    Lt Lower Ext Lt Hip Flexion;Lt Hip ABduction;Lt Knee Extension;Lt Knee Flexion;Lt Ankle Dorsiflexion  -CC       MMT Right Lower Ext    Rt Hip Flexion MMT, Gross Movement (4+/5) good plus  -CC    Rt Hip ABduction MMT, Gross Movement (4-/5) good minus  -CC     Rt Knee Extension MMT, Gross Movement (5/5) normal  -CC    Rt Knee Flexion MMT, Gross Movement (5/5) normal  -CC    Rt Ankle Dorsiflexion MMT, Gross Movement (5/5) normal  -CC       MMT Left Lower Ext    Lt Hip Flexion MMT, Gross Movement (4+/5) good plus  -CC    Lt Hip ABduction MMT, Gross Movement (4-/5) good minus  -CC    Lt Knee Extension MMT, Gross Movement (5/5) normal  -CC    Lt Knee Flexion MMT, Gross Movement (5/5) normal  -CC    Lt Ankle Dorsiflexion MMT, Gross Movement (5/5) normal  -CC       Flexibility    Flexibility Tested? Lower Extremity  -CC       Lower Extremity Flexibility    Hamstrings Bilateral:;Moderately limited  -CC              User Key  (r) = Recorded By, (t) = Taken By, (c) = Cosigned By      Initials Name Provider Type    Luciana Vivar, PT Physical Therapist                                PT Assessment/Plan       Row Name 07/24/24 1400          PT Assessment    Assessment Comments Josue Castanon was seen for 5 physical therapy sessions for dyspareunia.  Treatment included therapeutic exercise, therapeutic activity, and patient education with home exercise program . Progress to physical therapy goals was good. Pt met 3/3 STG and 3/3 LTG. She reports some ongoing back pain and deep left pelvic pain today, but all pain with IC resolved. Unable to reproduce MSK back/hip pain upon exam today, and pt declines internal vaginal exam. Recommended pt resume pelvic wand with focus on L side muscle release, if s/s not improved should follow up with OBGYN. She was discharged to an independent [facility] and provided patient education to self-manage condition.  -CC        PT Plan    PT Plan Comments d/c  -CC               User Key  (r) = Recorded By, (t) = Taken By, (c) = Cosigned By      Initials Name Provider Type    Luciana Vivar, TESFAYE Physical Therapist                         OP Exercises       Row Name 07/24/24 1400             Subjective    Subjective Comments Reports pain with IC  resolved, some higher L sided flank pain  -CC         Total Minutes    14570 - PT Therapeutic Exercise Minutes 12  -CC      60828 - PT Therapeutic Activity Minutes 15  -CC         Exercise 1    Exercise Name 1 Cont pelvic wand with focus on L side muscles (demo with model)  -CC      Cueing 1 Verbal;Demo  -CC         Exercise 2    Exercise Name 2 L long sitting HS str  -CC      Cueing 2 Verbal  -CC      Reps 2 1  -CC      Time 2 30s  -CC         Exercise 3    Exercise Name 3 HL kegel  -CC      Cueing 3 Verbal  -CC      Reps 3 discussed for HEP, discussed working on endurance  -CC         Exercise 4    Exercise Name 4 back/hip reassessment//objective measure update  -CC                User Key  (r) = Recorded By, (t) = Taken By, (c) = Cosigned By      Initials Name Provider Type    Luciana Vivar, PT Physical Therapist                                 PT OP Goals       Row Name 07/24/24 1400          PT Short Term Goals    STG Date to Achieve 04/19/24  -CC     STG 1 Pt will be independent initial HEP to address full body and pelvic floor relaxation.  -CC     STG 1 Progress Met  -CC     STG 2 Pt will report </=2/10 pain with pelvic exam.  -CC     STG 2 Progress Met  -CC     STG 3 Pt will demo ability to fully relax pelvic floor following contraction without cueing.  -     STG 3 Progress Met  -CC        Long Term Goals    LTG Date to Achieve 05/19/24  -CC     LTG 1 Pt will be independent with comprehensive HEP to manage condition long term.  -CC     LTG 1 Progress Met  -CC     LTG 2 Pt will be able to tolerate a pelvic exam with 0/10 pelvic floor pain.  -CC     LTG 2 Progress Met  -CC     LTG 3 Pt will report </= 1/10 pelvic pain with intercourse.  -CC     LTG 3 Progress Met  -CC               User Key  (r) = Recorded By, (t) = Taken By, (c) = Cosigned By      Initials Name Provider Type    Luciana Vivar, PT Physical Therapist                                  Time Calculation:   Start Time:  1409  Stop Time: 1436  Time Calculation (min): 27 min  Total Timed Code Minutes- PT: 27 minute(s)  Timed Charges  74410 - PT Therapeutic Exercise Minutes: 12  62888 - PT Therapeutic Activity Minutes: 15  Total Minutes  Timed Charges Total Minutes: 27   Total Minutes: 27    Therapy Charges for Today       Code Description Service Date Service Provider Modifiers Qty    10173331527 HC PT THER PROC EA 15 MIN 7/24/2024 Luciana Chua, PT GP 1    06302265358 HC PT THERAPEUTIC ACT EA 15 MIN 7/24/2024 Luciana Chua, PT GP 1                 OP PT Discharge Summary  Date of Discharge: 07/24/24  Reason for Discharge: All goals achieved  Outcomes Achieved: Refer to plan of care for updates on goals achieved  Discharge Destination: Home with home program  Discharge Instructions/Additional Comments: see assessment      Luciana Chua, PT  7/24/2024

## 2025-03-13 ENCOUNTER — TELEPHONE (OUTPATIENT)
Dept: OBSTETRICS AND GYNECOLOGY | Facility: CLINIC | Age: 35
End: 2025-03-13
Payer: COMMERCIAL

## 2025-03-13 NOTE — TELEPHONE ENCOUNTER
Provider:  DR VARMA    Caller: ARNALDO ORTEZ    Phone Number: [phone]     Reason for Call: WHEN PATIENT WAS LAST SEEN THERE WAS AN APPT FOR AN US MADE BUT SHE CANCELLED IT AND NEVER RESCHEDULED IT//SHE STATED THAT THE PELVIC PAIN HAS CONTINUED AND SHE WOULD LIKE TO DO IT NOW AND HAVE BOTH OVARIES LOOKED AT-BUT IT HAS BEEN GREATER THAN A YEAR//PLEASE FOLLOW UP

## 2025-03-14 NOTE — TELEPHONE ENCOUNTER
You may schedule her to come in for an ultrasound, but she also needs to schedule an annual exam at some point

## 2025-04-26 NOTE — PROGRESS NOTES
"GYN ANNUAL EXAM     Chief Complaint   Patient presents with    Annual Exam     AE and last pap        HPI    Josue is a 35 y.o. female who presents for annual well woman exam. She is a patient of Dr. Osuna. she does report pelvic pain that she notes as \"originating from her ovaries and ureters.) this pain has been approximately 1 year and started following a loop electrosurgical excision procedure (LEEP).  She reports that the pelvic pain does not occur during intercourse but is felt after orgasm.  She reports no urinary symptoms.    OB History          0    Para   0    Term   0       0    AB   0    Living   0         SAB   0    IAB   0    Ectopic   0    Molar   0    Multiple   0    Live Births   0                SUBJECTIVE    MENSTRUAL & SEXUAL HEALTH  LMP: Patient's last menstrual period was 2025.  Menses regularity: regular every 28-30 days  Menses length: 3 to 4 days  Dysmenorrhea: mild, occurring throughout menses  Cyclic symptoms: none  Current contraception: none  Last pap: , Normal PAP  History of abnormal pap: yes LGSIL in  with LEEP   History of STD: No  Family history of cancer: denies       Family history of breast cancer: no  Performs SBE: performs monthly.  Incontinence: Patient reports that she is not currently experiencing any symptoms of urinary incontinence.   Dyspareunia: not during intercourse but does feel pain with orgasm    Past Medical History:   Diagnosis Date    Allergic rhinitis     Eczema        Past Surgical History:   Procedure Laterality Date    WISDOM TOOTH EXTRACTION         No current outpatient medications on file.    Allergies   Allergen Reactions    Cefaclor Rash       Social History     Tobacco Use    Smoking status: Never    Smokeless tobacco: Never   Vaping Use    Vaping status: Never Used   Substance Use Topics    Alcohol use: Never    Drug use: Never       Family History   Problem Relation Age of Onset    No Known Problems Mother     No " "Known Problems Father     Diabetes Maternal Aunt     Diabetes Paternal Uncle        Review of Systems   Constitutional:  Negative for fatigue, unexpected weight gain and unexpected weight loss.   Gastrointestinal:  Negative for abdominal pain.   Genitourinary:  Positive for pelvic pain and vaginal discharge. Negative for decreased libido, difficulty urinating, dyspareunia, dysuria, pelvic pressure, urgency and urinary incontinence.   All other systems reviewed and are negative.      OBJECTIVE    /85   Ht 152.4 cm (60\")   Wt 42.6 kg (94 lb)   LMP 04/20/2025   BMI 18.36 kg/m²     Physical Exam  Constitutional:       General: She is awake. She is not in acute distress.     Appearance: Normal appearance. She is well-developed and well-groomed. She is not ill-appearing.   Genitourinary:      Vulva, bladder and urethral meatus normal.      Right Labia: No rash, tenderness, lesions or skin changes.     Left Labia: No tenderness, lesions, skin changes or rash.     No labial fusion noted.      No inguinal adenopathy present in the right or left side.     Vaginal discharge present.      No vaginal erythema, tenderness, bleeding, ulceration or granulation tissue.      No vaginal prolapse present.     No vaginal atrophy present.     No cervical discharge, friability, lesion, polyp, eversion or elongation.      Uterus is not enlarged, tender or prolapsed.      Pelvic exam was performed with patient in the lithotomy position.   Breasts:     Breasts are symmetrical.      Breasts are soft.     Right: Normal.      Left: Normal.   HENT:      Head: Normocephalic and atraumatic.   Eyes:      General: No scleral icterus.     Conjunctiva/sclera: Conjunctivae normal.   Cardiovascular:      Rate and Rhythm: Normal rate and regular rhythm.      Heart sounds: Normal heart sounds.   Pulmonary:      Effort: Pulmonary effort is normal.      Breath sounds: Normal breath sounds.   Abdominal:      Palpations: Abdomen is soft.      " Hernia: There is no hernia in the left inguinal area or right inguinal area.   Musculoskeletal:         General: Normal range of motion.      Cervical back: Normal range of motion.   Lymphadenopathy:      Lower Body: No right inguinal adenopathy. No left inguinal adenopathy.   Neurological:      Mental Status: She is alert.   Skin:     General: Skin is warm and dry.      Coloration: Skin is not jaundiced or pale.   Psychiatric:         Behavior: Behavior normal. Behavior is cooperative.   Vitals reviewed.         ASSESSMENT     Diagnoses and all orders for this visit:    1. Encounter for gynecological examination without abnormal finding (Primary)  -     IGP, Apt HPV,rfx 16 / 18,45    2. Pelvic pain  -     NuSwab VG+ - Swab, Vagina         PLAN   WELL WOMAN EXAM: Pap smear collected today. Recommend MVI daily.    CONTRACEPTION: none.   PELVIC PAIN: Discussed TVUS result-see preliminary read below.  Vaginal cultures also obtained today we will notify patient and treat if indicated.  Differential diagnosis today includes endometriosis, particularly given post orgasm pain, which warrants further investigation.  Recommended that she return to care if Dr. Osuna to discuss further investigation to rule out additional causes such as endometriosis.  PCOS discussed at length with patient including annual testing of A1c to monitor for insulin resistance.  Also recommended that the patient continue with her physical therapy as planned.  SMOKING STATUS: non smoker.  BREAST HEALTH: Encouraged annual mammogram screening starting at age 40. Instructed on how to perform SBE. Encouraged breast health self awareness.  EXERCISE: Encouraged 150 minutes of exercise per week if not medially contraindicated.   BMI: Body mass index is 18.36 kg/m².     Return for annual exam or as needed before.    I spent 30 minutes caring for Josue on this date of service. This time includes time spent by me in the following activities, but not limited  to: preparing for the visit, reviewing tests, performing a medically appropriate examination and/or evaluation, counseling and educating the patient/family/caregiver, referring and communicating with other health care professionals, documenting information in the medical record, independently interpreting results and communicating that information with the patient/family/caregiver, care coordination, ordering medications, ordering test(s), ordering procedure(s), obtaining a separately obtained history, and reviewing a separately obtained history.    Donna Bernard CNM  5/3/2025  10:22 EDT        PRELIMINARY ULTRASOUND RESULT  Study: Transvaginal pelvic ultrasound  Findings:  Uterus measures 7.17 x 4.36 x 3.21 cm, volume 52.542 cm3, anteverted  There is an anterior uterine fibroid noted measuring 0.78 x 0.50 x 0.64 cm, volume 0.103 cm3.  Endometrial thickness measures 0.60 cm, and is homogenous appearance without evidence of polyps.  The endometrium appears arcuate.  Cervix is normal-appearing.  Left ovary: 4.47 x 2.85 x 2.26 cm, volume 15.075 cm3  Ovary appears polycystic.  Right ovary: 4.33 x 1.75 x 2.16 cm, volume 8.570 cm3  Ovary appears polycystic.  There is no free fluid.  Comparative data: not available for examination  Please note: Clinical decisions and patient counseling in this encounter were based on real-time bedside ultrasound findings and the clinical presentation at the time of evaluation. Final medical decision-making may be subject to change pending formal radiology interpretation of the ultrasound images. The patient will be contacted if any additional follow-up or changes to the care plan are indicated once the official report is reviewed.  Donna Bernard CNM  4/29/2025  17:01 EDT

## 2025-04-29 ENCOUNTER — OFFICE VISIT (OUTPATIENT)
Dept: OBSTETRICS AND GYNECOLOGY | Facility: CLINIC | Age: 35
End: 2025-04-29
Payer: COMMERCIAL

## 2025-04-29 VITALS
SYSTOLIC BLOOD PRESSURE: 127 MMHG | WEIGHT: 94 LBS | BODY MASS INDEX: 18.46 KG/M2 | DIASTOLIC BLOOD PRESSURE: 85 MMHG | HEIGHT: 60 IN

## 2025-04-29 DIAGNOSIS — R10.2 PELVIC PAIN: ICD-10-CM

## 2025-04-29 DIAGNOSIS — Z01.419 ENCOUNTER FOR GYNECOLOGICAL EXAMINATION WITHOUT ABNORMAL FINDING: Primary | ICD-10-CM

## 2025-04-30 ENCOUNTER — OFFICE VISIT (OUTPATIENT)
Dept: OBSTETRICS AND GYNECOLOGY | Facility: CLINIC | Age: 35
End: 2025-04-30
Payer: COMMERCIAL

## 2025-04-30 VITALS
HEART RATE: 82 BPM | DIASTOLIC BLOOD PRESSURE: 88 MMHG | BODY MASS INDEX: 18.53 KG/M2 | SYSTOLIC BLOOD PRESSURE: 129 MMHG | WEIGHT: 94.4 LBS | HEIGHT: 60 IN

## 2025-04-30 DIAGNOSIS — N90.60 ENLARGEMENT OF LABIA: Primary | ICD-10-CM

## 2025-04-30 DIAGNOSIS — F52.31 INHIBITED FEMALE ORGASM: ICD-10-CM

## 2025-04-30 PROCEDURE — 99213 OFFICE O/P EST LOW 20 MIN: CPT | Performed by: OBSTETRICS & GYNECOLOGY

## 2025-04-30 NOTE — PROGRESS NOTES
"Chief Complaint  Consult- pt states she wants to talk about her labia.     Subjective        Josue Castanon presents to [facility]  History of Present Illness    Patient presents today with complaints that her labia are disintegrating.  She feels that it is related to her not being able to achieve a full orgasm, which began after she had her LEEP procedure.  She also reports her clitoris as \"lifeless\" and feels that this is related to her labia being less \"plump.\"  Patient has done pelvic floor therapy in the past.  She is denying any current pain with intercourse or insertion.    Objective   Vital Signs:  /88   Pulse 82   Ht 152.4 cm (60\")   Wt 42.8 kg (94 lb 6.4 oz)   BMI 18.44 kg/m²   Estimated body mass index is 18.44 kg/m² as calculated from the following:    Height as of this encounter: 152.4 cm (60\").    Weight as of this encounter: 42.8 kg (94 lb 6.4 oz).          Physical Exam  Vitals reviewed. Exam conducted with a chaperone present.   Constitutional:       Appearance: She is well-developed.   Genitourinary:     Labia:         Right: No rash, tenderness, lesion or injury.         Left: No rash, tenderness, lesion or injury.       Vagina: Normal.   Neurological:      Mental Status: She is alert.   Psychiatric:         Behavior: Behavior normal.        Result Review :                Assessment and Plan   Diagnoses and all orders for this visit:    1. Enlargement of labia (Primary)    2. Inhibited female orgasm      Patient's vaginal exam did not reveal any abnormalities that were not within the range of normal.  I discussed with patient that aging can result in changes in the labia due to loss of collagen.  I did discuss with her my recommendations for her to make an appointment at [facility] to discuss different treatment options that are available for her concerns.  I did discuss with her that O spot injection and possible labial fillers if these continue to be bothersome.   "     Follow Up   No follow-ups on file.  Patient was given instructions and counseling regarding her condition or for health maintenance advice. Please see specific information pulled into the AVS if appropriate.

## 2025-05-02 LAB
A VAGINAE DNA VAG QL NAA+PROBE: NORMAL SCORE
BVAB2 DNA VAG QL NAA+PROBE: NORMAL SCORE
C ALBICANS DNA VAG QL NAA+PROBE: NEGATIVE
C GLABRATA DNA VAG QL NAA+PROBE: NEGATIVE
C TRACH DNA SPEC QL NAA+PROBE: NEGATIVE
MEGA1 DNA VAG QL NAA+PROBE: NORMAL SCORE
N GONORRHOEA DNA VAG QL NAA+PROBE: NEGATIVE
T VAGINALIS DNA VAG QL NAA+PROBE: NEGATIVE

## 2025-05-05 LAB
CYTOLOGIST CVX/VAG CYTO: NORMAL
CYTOLOGY CVX/VAG DOC CYTO: NORMAL
CYTOLOGY CVX/VAG DOC THIN PREP: NORMAL
DX ICD CODE: NORMAL
HPV I/H RISK 4 DNA CVX QL PROBE+SIG AMP: NEGATIVE
OTHER STN SPEC: NORMAL
SERVICE CMNT-IMP: NORMAL
STAT OF ADQ CVX/VAG CYTO-IMP: NORMAL

## 2025-05-15 NOTE — TELEPHONE ENCOUNTER
Patient is calling to let you know that she is having pain in her lower left back area, and the lower left sie of her abdomen. The pain is a 4/10. She is wondering if this can be because of the procedure she had done in our office (I'm guessing leep?)     She also would like an appointment with you asap. (Did not disclose why.)    Please advise, thanks!   Fall, accidental Distal radius fracture, right